# Patient Record
Sex: FEMALE | Race: WHITE | NOT HISPANIC OR LATINO | Employment: UNEMPLOYED | URBAN - METROPOLITAN AREA
[De-identification: names, ages, dates, MRNs, and addresses within clinical notes are randomized per-mention and may not be internally consistent; named-entity substitution may affect disease eponyms.]

---

## 2018-12-13 ENCOUNTER — CLINICAL SUPPORT (OUTPATIENT)
Dept: BARIATRICS | Facility: CLINIC | Age: 53
End: 2018-12-13

## 2018-12-13 VITALS
DIASTOLIC BLOOD PRESSURE: 78 MMHG | HEIGHT: 67 IN | SYSTOLIC BLOOD PRESSURE: 118 MMHG | BODY MASS INDEX: 41.81 KG/M2 | RESPIRATION RATE: 16 BRPM | WEIGHT: 266.4 LBS | TEMPERATURE: 99.6 F | HEART RATE: 78 BPM

## 2018-12-13 DIAGNOSIS — E66.01 MORBID (SEVERE) OBESITY DUE TO EXCESS CALORIES (HCC): Primary | ICD-10-CM

## 2018-12-13 PROCEDURE — RECHECK

## 2018-12-13 RX ORDER — ESCITALOPRAM OXALATE 20 MG/1
10 TABLET ORAL DAILY
COMMUNITY

## 2018-12-13 RX ORDER — OMEPRAZOLE 40 MG/1
40 CAPSULE, DELAYED RELEASE ORAL DAILY
COMMUNITY
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
## 2018-12-14 DIAGNOSIS — E66.01 MORBID OBESITY (HCC): Primary | ICD-10-CM

## 2018-12-14 NOTE — PROGRESS NOTES
Bariatric Nutrition Assessment Note    Type of surgery    Preop  Surgery Date: TBD  Surgeon: Dr Vamsi Cole  48 y o   female     Wt with BMI of 25: 160 5lbs  Pre-Op Excess Wt: 105 5lbs  Blood pressure 118/78, pulse 78, temperature 99 6 °F (37 6 °C), temperature source Tympanic, resp  rate 16, height 5' 7 2" (1 707 m), weight 121 kg (266 lb 6 4 oz)      Weight History   Onset of Obesity: Adult, late 29's once   Family history of obesity: Yes  Wt Loss Attempts: Commercial Programs ([employer]/[place], [employer]eyda [employer], etc )  Exercise  Self Created Diets (Portion Control, Healthy Food Choices, etc )  Maximum Wt Lost: 100lbs with weight watchers, but regained all of that plus more (regained 90lbs in one year)    Review of History and Medications   Past Medical History:   Diagnosis Date    Acid reflux     Anxiety     Asthma     Depression     Frequent headaches     Heartburn     Hiatal hernia     Increased urinary frequency     Night sweats     Osteoarthritis     Sleep apnea     SOB (shortness of breath)     Swelling of multiple joints      Past Surgical History:   Procedure Laterality Date    AUGMENTATION BREAST      FOOT SURGERY Right      Social History     Social History    Marital status: Single     Spouse name: N/A    Number of children: N/A    Years of education: N/A     Social History Main Topics    Smoking status: Former Smoker    Smokeless tobacco: Never Used      Comment: Quit 15 years ago    Alcohol use Yes      Comment: social    Drug use: No    Sexual activity: Not Asked     Other Topics Concern    None     Social History Narrative    None       Current Outpatient Prescriptions:     escitalopram (LEXAPRO) 20 mg tablet, Take 5 mg by mouth daily, Disp: , Rfl:     omeprazole (PriLOSEC) 40 MG capsule, Take 40 mg by mouth daily, Disp: , Rfl:     PROAIR  (90 Base) MCG/ACT inhaler, as needed, Disp: , Rfl: 0    Food Intake and Lifestyle Assessment   Food Intake Assessment completed via food log brought by patient  Breakfast: 1 5cups of cereal with 2% millk (granola type cereal from [employer])  Snack: -   Lunch: Meatloaf and 12oz coke or cheese steak and french fries and a coke or BLT with johansen and a coke  Sometimes will have a Turkish with lunch 1-2 times per week  Snack: -  Dinner: meatloaf and tater tots and beans or coconut flounder with beans and tater tots or mushroom ravioli and asparagus or beer battered fish with creamed spinach or pork chops with mashed potatoes  Snack: admits has been going overboard with the night snack since she got  and moved into her own place about 2 months ago  Admits to eating one whole cake in an evening or 1-2 pints of Haagen [employer]azs ice cream  Beverage intake: water and regular soda and G2  Protein supplement: none at this time  Estimated protein intake per day: >60gm  Estimated fluid intake per day: 64oz (12oz is a regular coke)  Meals eaten away from home: less now that she is   Will occasional stop for chinese or CAPNIA, but that isn't even once a week at this time  Typical meal pattern: 3 meals per day and 1 large snack per day  Eating Behaviors: Consumption of high calorie/ high fat foods, Consumption of high calorie beverages, Large portion sizes, Mindless eating, Emotional eating and Craves sweet foods  Food allergies or intolerances: No Known Allergies  Cultural or Restorationism considerations: none    Physical Assessment  Physical Activity  Types of exercise: None  Current physical limitations: h/o of shatter heel, now with pain when walking too much  Does have a recumbent bike she will start using      Psychosocial Assessment   Support systems: friend(s)  Socioeconomic factors: none    Nutrition Diagnosis  Diagnosis: Overweight / Obesity (NC-3 3)  Related to: Limited adherence to nutrition-related recommendations, Physical inactivity and Excessive energy intake  As Evidenced by: BMI >25 Nutrition Prescription: Recommend the following diet  Regular    Interventions and Teaching   Discussed pre-op and post-op nutrition guidelines  Patient educated and handouts provided  Surgical changes to stomach / GI  Capacity of post-surgery stomach  Diet progression  Adequate hydration  Sugar and fat restriction to decrease "dumping syndrome"  Fat restriction to decrease steatorrhea  Expected weight loss  Weight loss plateaus/ possibility of weight regain  Exercise  Suggestions for pre-op diet  Nutrition considerations after surgery  Protein supplements  Meal planning and preparation  Appropriate carbohydrate, protein, and fat intake, and food/fluid choices to maximize safe weight loss, nutrient intake, and tolerance   Dietary and lifestyle changes  Possible problems with poor eating habits  Intuitive eating  Techniques for self monitoring and keeping daily food journal  Potential for food intolerance after surgery, and ways to deal with them including: lactose intolerance, nausea, reflux, vomiting, diarrhea, food intolerance, appetite changes, gas  Vitamin / Mineral supplementation:  Pt states she is taking a lot of Puritan pride supplement products  Requested she bring in a list at her next appt  Education provided to: patient    Barriers to learning: No barriers identified  Readiness to change: preparation    Prior research on procedure: books, internet, discussed with provider and friends or family    Comprehension: verbalizes understanding     Expected Compliance: good    Recommendations  Pt is an appropriate candidate for surgery  Yes    Pt interested in sleeve gastrectomy in order to prevent further medical issues  She currently has sleep apnea and uses her CPAP  Reviewed her food log and pt appears to eat large portions of high calorie, high fat, high sugar foods    Reviewed the necessary diet changes needed after surgery and came up with some goals to start working on over the next 6 months  Pt is aware her evening snack is excessive and feels she needs to eliminate it completely because just a little bite can trigger her to eat the whole thing  Pt will also start making healthier food choices through the day and keep a food log to be reviewed at her visits  Advised the pt she needs to lose at least 5% of her total body weight by the day of surgery which is 13lbs (BMI [id number])  Evaluation / Monitoring  Dietitian to Monitor:   Eating pattern as discussed   Tolerance of nutrition prescription   Body weight   Physical activity    Goals  Eliminate sugar sweetened beverages (her one can of coke per day)  Food journal (writes it down in a notebook    Requested she bring to all her appts)  Exercise 30 minutes 5 times per week (will start on the stationary bike 10 mins per day and increase as tolerated)  Complete lession plans 1-6  Eat 3 meals per day  Eliminate evening snack of cake and ice cream    Time Spent:   1 Hour

## 2019-01-04 ENCOUNTER — OFFICE VISIT (OUTPATIENT)
Dept: BARIATRICS | Facility: CLINIC | Age: 54
End: 2019-01-04
Payer: COMMERCIAL

## 2019-01-04 VITALS
TEMPERATURE: 98.4 F | RESPIRATION RATE: 14 BRPM | HEIGHT: 67 IN | WEIGHT: 275 LBS | SYSTOLIC BLOOD PRESSURE: 114 MMHG | DIASTOLIC BLOOD PRESSURE: 78 MMHG | HEART RATE: 75 BPM | BODY MASS INDEX: 43.16 KG/M2

## 2019-01-04 DIAGNOSIS — E66.01 MORBID (SEVERE) OBESITY DUE TO EXCESS CALORIES (HCC): Primary | ICD-10-CM

## 2019-01-04 PROCEDURE — 99204 OFFICE O/P NEW MOD 45 MIN: CPT | Performed by: SURGERY

## 2019-01-04 NOTE — PROGRESS NOTES
BARIATRIC INITIAL CONSULT - BARIATRIC SURGERY    Sid Golden 48 y o  female MRN: [id number]  Unit/Bed#:  Encounter: [id number]      HPI:  Sid Golden is a 48 y o  female who presents with a longstanding history of morbid obesity and inability to sustain a meaningful weight loss  Here today to discuss bariatric options  Body mass index is 42 82 kg/m²  ++Suffers from EMORY on CPAP, anxiety, hx Lyme, hx HIATAL HERNIA  S/p breast aug 6-7x, benign rib tumor excision, foot sx    Visit type: initial visit    Symptoms: excess weight, weight increase and inability to loss weight    Associated Symptoms: depressed mood and anxiety    Associated Conditions: sleep apnea and abdominal obesity  Disease Complications: sleep apnea  Weight Loss Interest: high  Previous Diet Trials: low carb    Exercise Frequency:daily  Types of Exercise: walking      Review of Systems   Constitutional: Negative  Eyes: Negative  Respiratory: Negative  Cardiovascular: Negative  Gastrointestinal: Negative  Endocrine: Negative  Genitourinary: Negative  Musculoskeletal: Negative  Skin: Negative  Neurological: Negative  Hematological: Negative  Psychiatric/Behavioral: Negative  All other systems reviewed and are negative        Historical Information   Past Medical History:   Diagnosis Date    Acid reflux     Anxiety     Asthma     Depression     Frequent headaches     Heartburn     Hiatal hernia     Increased urinary frequency     Night sweats     Osteoarthritis     Sleep apnea     SOB (shortness of breath)     Swelling of multiple joints      Past Surgical History:   Procedure Laterality Date    AUGMENTATION BREAST      FOOT SURGERY Right      Social History   History   Alcohol Use    Yes     Comment: social     History   Drug Use No     History   Smoking Status    Former Smoker   Smokeless Tobacco    Never Used     Comment: Quit 15 years ago     Family History:   Family History Problem Relation Age of Onset    Heart attack Mother     Skin cancer Mother     Abnormal EKG Mother    [de-identified] Parkinsonism Father     Skin cancer Sister     Asthma Sister     Allergies Sister        Meds/Allergies   all medications and allergies reviewed  No Known Allergies    Objective       Current Vitals:   /78 (BP Location: Left arm, Patient Position: Sitting, Cuff Size: Large)   Pulse 75   Temp 98 4 °F (36 9 °C) (Tympanic)   Resp 14   Ht 5' 7 2" (1 707 m)   Wt 125 kg (275 lb)   BMI 42 82 kg/m²       Physical Exam   Constitutional: She is oriented to person, place, and time  She appears well-developed  HENT:   Head: Normocephalic  Eyes: Pupils are equal, round, and reactive to light  EOM are normal    Neck: Normal range of motion  Cardiovascular: Normal rate  Pulmonary/Chest: Effort normal    Abdominal: Soft  She exhibits no distension  Musculoskeletal: Normal range of motion  Neurological: She is alert and oriented to person, place, and time  Skin: Skin is warm and dry  Psychiatric: She has a normal mood and affect  Her behavior is normal  Judgment and thought content normal        Lab Results: I have personally reviewed pertinent lab results  Imaging: I have personally reviewed pertinent reports  EKG, Pathology, and Other Studies: I have personally reviewed pertinent reports  Assessment/PLAN:    48 y o  yo female with a long standing h/o of obesity and inability to sustain any meaningful weight loss on her own despite several attempts  She is interested in the Laparoscopic sleeve gastrectomy  ++Suffers from [facility] on CPAP, anxiety, hx Lyme, hx HIATAL HERNIA  S/p breast aug 6-7x, benign rib tumor excision, foot sx    As a part of her pre op evaluation, she will be referred to a cardiologist     She needs an EGD to evaluate the anatomy of her GI tract prior to the operation    I have spent over 45 minutes with her face to face in the office today discussing her options and details of the surgery  We have seen an animation of the surgery on the computer that illustrates how the operation is done and how the anatomy will be altered with the procedure  Over 50% of this was coordinating care  She was given the opportunity to ask questions and I have answered all of them  I have discussed and educated the patient with regards to the components of our multidisciplinary program and the importance of compliance and follow up in the post operative period  The patient was also instructed with regards to the importance of behavior modification, nutritional counseling, support meeting attendance and lifestyle changes that are important to ensure success  Although there is a great statistical chance of improvement or even resolution of most of her associated comorbidities, the results vary from patient to patient and they largely depend on her commitment and compliance  She needs to lose   13 lbs from 266# prior to the operation      LEIA Cordova   1/4/2019  11:43 AM

## 2019-01-11 ENCOUNTER — PREP FOR PROCEDURE (OUTPATIENT)
Dept: BARIATRICS | Facility: CLINIC | Age: 54
End: 2019-01-11

## 2019-01-11 DIAGNOSIS — E66.01 MORBID OBESITY (HCC): Primary | ICD-10-CM

## 2019-02-01 ENCOUNTER — OFFICE VISIT (OUTPATIENT)
Dept: BARIATRICS | Facility: CLINIC | Age: 54
End: 2019-02-01
Payer: COMMERCIAL

## 2019-02-01 VITALS
WEIGHT: 277.13 LBS | SYSTOLIC BLOOD PRESSURE: 112 MMHG | DIASTOLIC BLOOD PRESSURE: 78 MMHG | BODY MASS INDEX: 43.49 KG/M2 | TEMPERATURE: 98.5 F | RESPIRATION RATE: 16 BRPM | HEART RATE: 76 BPM | HEIGHT: 67 IN

## 2019-02-01 DIAGNOSIS — F41.9 ANXIETY: ICD-10-CM

## 2019-02-01 DIAGNOSIS — G47.39 OTHER SLEEP APNEA: ICD-10-CM

## 2019-02-01 DIAGNOSIS — K21.9 GASTROESOPHAGEAL REFLUX DISEASE, ESOPHAGITIS PRESENCE NOT SPECIFIED: ICD-10-CM

## 2019-02-01 DIAGNOSIS — E66.01 MORBID OBESITY (HCC): Primary | ICD-10-CM

## 2019-02-01 PROBLEM — G47.30 SLEEP APNEA: Status: ACTIVE | Noted: 2019-02-01

## 2019-02-01 PROCEDURE — 99214 OFFICE O/P EST MOD 30 MIN: CPT | Performed by: PHYSICIAN ASSISTANT

## 2019-02-01 NOTE — ASSESSMENT & PLAN NOTE
Interested in Vertical Sleeve Gastrectomy with Dr Yaz Dawkins    10% Weight loss-Not applicable   Screening labs-Not Completed  Support Group-Not Completed  Cardiac Risk Assessment-Not Completed  Upper Endoscopy-Not Completed, scheduled; H  Pylori biopsy-not completed  Sleep Medicine Assessment-Has EMORY, uses CPAP  Alcohol and nicotine use is not recommended following bariatric surgery  NSAIDs should be avoided following bariatric surgery  Advised that pregnancy should be avoided following bariatric surgery for 12-18 months and should not solely rely on OCP and consider additional/alternative sources for contraception due to potential absorption issues-completed

## 2019-02-01 NOTE — PROGRESS NOTES
Assessment/Plan:    Sleep apnea  -wears CPAP  -may improve with weight loss    Morbid obesity (HCC)  Interested in Vertical Sleeve Gastrectomy with Dr Dyllan Sousa  10% Weight loss-Not applicable   Screening labs-Not Completed  Support Group-Not Completed  Cardiac Risk Assessment-Not Completed  Upper Endoscopy-Not Completed, scheduled; H  Pylori biopsy-not completed  Sleep Medicine Assessment-Has [facility]ORY, uses CPAP  Alcohol and nicotine use is not recommended following bariatric surgery  NSAIDs should be avoided following bariatric surgery  Advised that pregnancy should be avoided following bariatric surgery for 12-18 months and should not solely rely on OCP and consider additional/alternative sources for contraception due to potential absorption issues-completed    GERD (gastroesophageal reflux disease)  -on omeprazole  -recommend avoid triggers, large meals  -may improve with weight loss    Anxiety  -on Lexapro    Goals:    Food log (ie ) www myfitnesspal com,sparkpeople  com,loseit com,calorieking  com,etc    No sugary beverages  At least 64oz of water daily  Increase physical activity by 10 minutes daily  Gradually increase physical activity to a goal of 5 days per week for 30 minutes of MODERATE intensity PLUS 2 days per week of FULL BODY resistance training  Practice lesson plans 1-6 in bariatric manual   Practice 30/60 rule   2334-2210 calories per day   Start 10 min cardio daily  Please get blood work done before 4th weight check (begininning of April)  Please attend support group    Follow up in approximately 1 month with Non-Surgical Physician/Advanced Practitioner  25 minute visit, >50% face-to-face time spent counseling patient on diet behavior and exercise modification for weight loss       Diagnoses and all orders for this visit:    Morbid obesity ([address] )    Other sleep apnea    Anxiety    Gastroesophageal reflux disease, esophagitis presence not specified          Subjective:   Chief Complaint   Patient presents with    Weight Check     Pt is here for 2/6 weight check visit  Patient ID: Joyce Javier  is a 48 y o  female with excess weight/obesity here to pursue weight managment  Patient is pursuing Vertical Sleeve Gastrectomy  HPI Patient presents for 2/6 weight check    The patient has been:  Following the lessons in the manual- no  Practicing the 30/60 minute rule- no  Food logging- no  Exercise- no    The following portions of the patient's history were reviewed and updated as appropriate: allergies, current medications, past family history, past medical history, past social history, past surgical history and problem list     Review of Systems   Respiratory: Positive for shortness of breath  Negative for cough  Cardiovascular: Negative for chest pain and palpitations  Gastrointestinal: Negative for abdominal pain, nausea and vomiting  Psychiatric/Behavioral: The patient is nervous/anxious  Objective:    /78 (BP Location: Left arm, Patient Position: Sitting, Cuff Size: Large)   Pulse 76   Temp 98 5 °F (36 9 °C) (Tympanic)   Resp 16   Ht 5' 7 2" (1 707 m)   Wt 126 kg (277 lb 2 oz)   BMI 43 15 kg/m²      Physical Exam   Constitutional: She is oriented to person, place, and time  She appears well-developed  HENT:   Head: Normocephalic  Pulmonary/Chest: Effort normal    Musculoskeletal: Normal range of motion  Neurological: She is alert and oriented to person, place, and time  Psychiatric: She has a normal mood and affect  Her behavior is normal    Nursing note and vitals reviewed

## 2019-02-01 NOTE — PATIENT INSTRUCTIONS
Goals: Food log (ie ) www myfitnesspal com,sparkpeople  com,loseit com,calorieking  com,etc    No sugary beverages  At least 64oz of water daily  Increase physical activity by 10 minutes daily   Gradually increase physical activity to a goal of 5 days per week for 30 minutes of MODERATE intensity PLUS 2 days per week of FULL BODY resistance training  Practice lesson plans 1-6 in bariatric manual   Practice 30/60 rule   6678-9894 calories per day   Start 10 min cardio daily  Please get blood work done before 4th weight check (begininning of April)  Please attend support group

## 2019-02-07 LAB
ALBUMIN SERPL-MCNC: 4.3 G/DL (ref 3.5–5.5)
ALBUMIN/GLOB SERPL: 1.5 {RATIO} (ref 1.2–2.2)
ALP SERPL-CCNC: 56 IU/L (ref 39–117)
ALT SERPL-CCNC: 40 IU/L (ref 0–32)
AST SERPL-CCNC: 33 IU/L (ref 0–40)
BILIRUB SERPL-MCNC: 0.4 MG/DL (ref 0–1.2)
BUN SERPL-MCNC: 17 MG/DL (ref 6–24)
BUN/CREAT SERPL: 19 (ref 9–23)
CALCIUM SERPL-MCNC: 9.4 MG/DL (ref 8.7–10.2)
CHLORIDE SERPL-SCNC: 102 MMOL/L (ref 96–106)
CHOLEST SERPL-MCNC: 173 MG/DL (ref 100–199)
CO2 SERPL-SCNC: 24 MMOL/L (ref 20–29)
CREAT SERPL-MCNC: 0.88 MG/DL (ref 0.57–1)
GLOBULIN SER-MCNC: 2.8 G/DL (ref 1.5–4.5)
GLUCOSE SERPL-MCNC: 97 MG/DL (ref 65–99)
HBA1C MFR BLD: 5.7 % (ref 4.8–5.6)
HDLC SERPL-MCNC: 42 MG/DL
LABCORP COMMENT: NORMAL
LDLC SERPL CALC-MCNC: 102 MG/DL (ref 0–99)
POTASSIUM SERPL-SCNC: 4.7 MMOL/L (ref 3.5–5.2)
PROT SERPL-MCNC: 7.1 G/DL (ref 6–8.5)
SL AMB EGFR AFRICAN AMERICAN: 87 ML/MIN/1.73
SL AMB EGFR NON AFRICAN AMERICAN: 75 ML/MIN/1.73
SL AMB VLDL CHOLESTEROL CALC: 29 MG/DL (ref 5–40)
SODIUM SERPL-SCNC: 139 MMOL/L (ref 134–144)
TRIGL SERPL-MCNC: 143 MG/DL (ref 0–149)
TSH SERPL DL<=0.005 MIU/L-ACNC: 2.44 UIU/ML (ref 0.45–4.5)

## 2019-02-22 LAB
BASOPHILS # BLD AUTO: 0 X10E3/UL (ref 0–0.2)
BASOPHILS NFR BLD AUTO: 0 %
EOSINOPHIL # BLD AUTO: 0.2 X10E3/UL (ref 0–0.4)
EOSINOPHIL NFR BLD AUTO: 3 %
ERYTHROCYTE [DISTWIDTH] IN BLOOD BY AUTOMATED COUNT: 13.1 % (ref 12.3–15.4)
HCT VFR BLD AUTO: 40.4 % (ref 34–46.6)
HGB BLD-MCNC: 13.5 G/DL (ref 11.1–15.9)
IMM GRANULOCYTES # BLD: 0 X10E3/UL (ref 0–0.1)
IMM GRANULOCYTES NFR BLD: 0 %
LYMPHOCYTES # BLD AUTO: 1.8 X10E3/UL (ref 0.7–3.1)
LYMPHOCYTES NFR BLD AUTO: 29 %
MCH RBC QN AUTO: 31.5 PG (ref 26.6–33)
MCHC RBC AUTO-ENTMCNC: 33.4 G/DL (ref 31.5–35.7)
MCV RBC AUTO: 94 FL (ref 79–97)
MONOCYTES # BLD AUTO: 0.4 X10E3/UL (ref 0.1–0.9)
MONOCYTES NFR BLD AUTO: 7 %
NEUTROPHILS # BLD AUTO: 3.6 X10E3/UL (ref 1.4–7)
NEUTROPHILS NFR BLD AUTO: 61 %
PLATELET # BLD AUTO: 266 X10E3/UL (ref 150–379)
RBC # BLD AUTO: 4.28 X10E6/UL (ref 3.77–5.28)
WBC # BLD AUTO: 6 X10E3/UL (ref 3.4–10.8)

## 2019-03-01 ENCOUNTER — OFFICE VISIT (OUTPATIENT)
Dept: BARIATRICS | Facility: CLINIC | Age: 54
End: 2019-03-01
Payer: COMMERCIAL

## 2019-03-01 VITALS
BODY MASS INDEX: 42.85 KG/M2 | HEART RATE: 87 BPM | HEIGHT: 67 IN | SYSTOLIC BLOOD PRESSURE: 102 MMHG | RESPIRATION RATE: 16 BRPM | TEMPERATURE: 98.2 F | DIASTOLIC BLOOD PRESSURE: 68 MMHG | WEIGHT: 273 LBS

## 2019-03-01 DIAGNOSIS — G47.39 OTHER SLEEP APNEA: ICD-10-CM

## 2019-03-01 DIAGNOSIS — E66.01 MORBID OBESITY (HCC): Primary | ICD-10-CM

## 2019-03-01 DIAGNOSIS — K21.9 GASTROESOPHAGEAL REFLUX DISEASE, ESOPHAGITIS PRESENCE NOT SPECIFIED: ICD-10-CM

## 2019-03-01 PROCEDURE — 99214 OFFICE O/P EST MOD 30 MIN: CPT | Performed by: PHYSICIAN ASSISTANT

## 2019-03-01 RX ORDER — CALCIUM GLUCONATE 45(500) MG
TABLET ORAL DAILY
COMMUNITY

## 2019-03-01 RX ORDER — MULTIVIT WITH MINERALS/LUTEIN
1000 TABLET ORAL DAILY
COMMUNITY
End: 2019-08-20 | Stop reason: HOSPADM

## 2019-03-01 RX ORDER — MULTIVIT WITH MINERALS/LUTEIN
1000 TABLET ORAL DAILY
COMMUNITY

## 2019-03-01 NOTE — PROGRESS NOTES
Assessment/Plan: Morbid obesity ([facility] 75 )  Interested in Vertical Sleeve Gastrectomy with Dr Walker Lose  10% Weight loss-Not applicable   Screening labs-Completed  Support Group-not completed  Cardiac Risk Assessment-Not Completed  Upper Endoscopy-Not Completed, scheduled; H  Pylori biopsy-not completed  Sleep Medicine Assessment-Has [facility], uses CPAP  Alcohol and nicotine use is not recommended following bariatric surgery  NSAIDs should be avoided following bariatric surgery  Advised that pregnancy should be avoided following bariatric surgery for 12-18 months and should not solely rely on OCP and consider additional/alternative sources for contraception due to potential absorption issues-completed    Sleep apnea  -wears CPAP  -may improve with weight loss    GERD (gastroesophageal reflux disease)  -on omeprazole  -recommend avoid triggers, large meals  -may improve with weight loss    Goals:    Food log (ie ) www myfitnesspal com,sparkpeople  com,loseit com,calorieking  com,etc    No sugary beverages  At least 64oz of water daily  Increase physical activity by 10 minutes daily  Gradually increase physical activity to a goal of 5 days per week for 30 minutes of MODERATE intensity PLUS 2 days per week of FULL BODY resistance training  Practice lesson plans 1-6 in bariatric manual   Practice 30/60 rule  Please schedule cardiology preoperative clearance - [phone]  Please attend support group    Follow up in approximately 1 month with Surgical Dietician  25 minute visit, >50% face-to-face time spent counseling patient on diet behavior and exercise modification for weight loss  Diagnoses and all orders for this visit:    Morbid obesity ([facility] 75 )    Other sleep apnea    Gastroesophageal reflux disease, esophagitis presence not specified    Other orders  -     Cholecalciferol (VITAMIN D3) 5000 units TABS; Take 5,000 Units by mouth daily  -     calcium gluconate 500 mg tablet;  Take 2400 mg daily  -     FOLIC ACID PO; Take 266 mg daily  -     Ascorbic Acid (VITAMIN C) 1000 MG tablet; Take 1,000 mg by mouth daily  -     vitamin E, tocopherol, 1,000 units capsule; Take 1,000 Units by mouth daily  -     co-enzyme Q-10 30 MG capsule; Take 30 mg by mouth daily  -     Misc Natural Products (TUMERSAID PO); Take 800 mg daily  -     Green Tea, Camillia sinensis, (GREEN TEA EXTRACT PO); Take 1 tablet by mouth daily  -     NON FORMULARY; Take 1 tab daily [facility])          Subjective:   Chief Complaint   Patient presents with    Weight Check     pt is here for 3/6 weight check  Patient ID: Grace Miller  is a 48 y o  female with excess weight/obesity here to pursue weight managment  Patient is pursuing Vertical Sleeve Gastrectomy  HPI Patient presents for 3/6 weight check    The patient has been:  Following the lessons in the [facility]  Practicing the 30/60 minute rule- yes  Food logging- reports food journaling with pen and paper 2500  Exercise- 10 min bike riding daily  And 30 min walk 2-3x per week    The following portions of the patient's history were reviewed and updated as appropriate: allergies, current medications, past family history, past medical history, past social history, past surgical history and problem list     Review of Systems   Respiratory: Negative for cough and shortness of breath  Cardiovascular: Negative for chest pain and palpitations  Gastrointestinal: Negative for abdominal pain, nausea and vomiting  Objective:    /68 (BP Location: Left arm, Patient Position: Sitting, Cuff Size: Large)   Pulse 87   Temp 98 2 °F (36 8 °C) (Tympanic)   Resp 16   Ht 5' 7 2" (1 707 m)   Wt 124 kg (273 lb)   BMI 42 50 kg/m²      Physical Exam   Constitutional: She is oriented to person, place, and time  She appears well-developed and well-nourished  HENT:   Head: Normocephalic  Pulmonary/Chest: Effort normal    Neurological: She is alert and oriented to person, place, and time  Skin: Skin is warm  Psychiatric: She has a normal mood and affect  Her behavior is normal  Thought content normal    Nursing note and vitals reviewed

## 2019-03-01 NOTE — ASSESSMENT & PLAN NOTE
Interested in Vertical Sleeve Gastrectomy with Dr Vahid Sepulveda    10% Weight loss-Not applicable   Screening labs-Completed  Support Group-not completed  Cardiac Risk Assessment-Not Completed  Upper Endoscopy-Not Completed, scheduled; H  Pylori biopsy-not completed  Sleep Medicine Assessment-Has [facility], uses CPAP  Alcohol and nicotine use is not recommended following bariatric surgery  NSAIDs should be avoided following bariatric surgery  Advised that pregnancy should be avoided following bariatric surgery for 12-18 months and should not solely rely on OCP and consider additional/alternative sources for contraception due to potential absorption issues-completed

## 2019-03-01 NOTE — PATIENT INSTRUCTIONS
Goals: Food log (ie ) www myfitnesspal com,sparkpeople  com,loseit com,calorieking  com,etc    No sugary beverages  At least 64oz of water daily  Increase physical activity by 10 minutes daily   Gradually increase physical activity to a goal of 5 days per week for 30 minutes of MODERATE intensity PLUS 2 days per week of FULL BODY resistance training  Practice lesson plans 1-6 in bariatric manual   Practice 30/60 rule  Please schedule cardiology preoperative clearance - [phone]  Please attend support group

## 2019-04-05 ENCOUNTER — OFFICE VISIT (OUTPATIENT)
Dept: BARIATRICS | Facility: CLINIC | Age: 54
End: 2019-04-05

## 2019-04-05 VITALS — HEIGHT: 67 IN | BODY MASS INDEX: 41.47 KG/M2 | WEIGHT: 264.2 LBS

## 2019-04-05 DIAGNOSIS — Z98.84 BARIATRIC SURGERY STATUS: ICD-10-CM

## 2019-04-05 DIAGNOSIS — E66.01 MORBID (SEVERE) OBESITY DUE TO EXCESS CALORIES (HCC): Primary | ICD-10-CM

## 2019-04-05 PROCEDURE — RECHECK

## 2019-04-22 ENCOUNTER — TELEPHONE (OUTPATIENT)
Dept: BARIATRICS | Facility: CLINIC | Age: 54
End: 2019-04-22

## 2019-04-24 ENCOUNTER — ANESTHESIA EVENT (OUTPATIENT)
Dept: PERIOP | Facility: HOSPITAL | Age: 54
End: 2019-04-24
Payer: COMMERCIAL

## 2019-04-26 ENCOUNTER — ANESTHESIA (OUTPATIENT)
Dept: PERIOP | Facility: HOSPITAL | Age: 54
End: 2019-04-26
Payer: COMMERCIAL

## 2019-04-26 ENCOUNTER — HOSPITAL ENCOUNTER (OUTPATIENT)
Facility: HOSPITAL | Age: 54
Setting detail: OUTPATIENT SURGERY
Discharge: HOME | End: 2019-04-26
Attending: SURGERY | Admitting: SURGERY
Payer: COMMERCIAL

## 2019-04-26 VITALS
DIASTOLIC BLOOD PRESSURE: 57 MMHG | WEIGHT: 266 LBS | BODY MASS INDEX: 41.75 KG/M2 | SYSTOLIC BLOOD PRESSURE: 114 MMHG | OXYGEN SATURATION: 92 % | TEMPERATURE: 98.7 F | HEIGHT: 67 IN | HEART RATE: 93 BPM | RESPIRATION RATE: 20 BRPM

## 2019-04-26 DIAGNOSIS — E66.01 MORBID OBESITY (HCC): ICD-10-CM

## 2019-04-26 LAB — EXT PREGNANCY TEST URINE: NEGATIVE

## 2019-04-26 PROCEDURE — 88342 IMHCHEM/IMCYTCHM 1ST ANTB: CPT | Performed by: PATHOLOGY

## 2019-04-26 PROCEDURE — 88305 TISSUE EXAM BY PATHOLOGIST: CPT | Performed by: PATHOLOGY

## 2019-04-26 PROCEDURE — 81025 URINE PREGNANCY TEST: CPT | Performed by: ANESTHESIOLOGY

## 2019-04-26 PROCEDURE — NC001 PR NO CHARGE: Performed by: SURGERY

## 2019-04-26 PROCEDURE — 43239 EGD BIOPSY SINGLE/MULTIPLE: CPT | Performed by: SURGERY

## 2019-04-26 RX ORDER — IPRATROPIUM BROMIDE AND ALBUTEROL SULFATE 2.5; .5 MG/3ML; MG/3ML
3 SOLUTION RESPIRATORY (INHALATION) ONCE
Status: COMPLETED | OUTPATIENT
Start: 2019-04-26 | End: 2019-04-26

## 2019-04-26 RX ORDER — ONDANSETRON 2 MG/ML
4 INJECTION INTRAMUSCULAR; INTRAVENOUS ONCE AS NEEDED
Status: DISCONTINUED | OUTPATIENT
Start: 2019-04-26 | End: 2019-04-26 | Stop reason: HOSPADM

## 2019-04-26 RX ORDER — LIDOCAINE HYDROCHLORIDE 10 MG/ML
INJECTION, SOLUTION INFILTRATION; PERINEURAL AS NEEDED
Status: DISCONTINUED | OUTPATIENT
Start: 2019-04-26 | End: 2019-04-26 | Stop reason: SURG

## 2019-04-26 RX ORDER — PROPOFOL 10 MG/ML
INJECTION, EMULSION INTRAVENOUS AS NEEDED
Status: DISCONTINUED | OUTPATIENT
Start: 2019-04-26 | End: 2019-04-26 | Stop reason: SURG

## 2019-04-26 RX ORDER — SODIUM CHLORIDE, SODIUM LACTATE, POTASSIUM CHLORIDE, CALCIUM CHLORIDE 600; 310; 30; 20 MG/100ML; MG/100ML; MG/100ML; MG/100ML
100 INJECTION, SOLUTION INTRAVENOUS CONTINUOUS
Status: DISCONTINUED | OUTPATIENT
Start: 2019-04-26 | End: 2019-04-26 | Stop reason: HOSPADM

## 2019-04-26 RX ORDER — DIPHENHYDRAMINE HYDROCHLORIDE 50 MG/ML
12.5 INJECTION INTRAMUSCULAR; INTRAVENOUS ONCE AS NEEDED
Status: DISCONTINUED | OUTPATIENT
Start: 2019-04-26 | End: 2019-04-26 | Stop reason: HOSPADM

## 2019-04-26 RX ADMIN — SODIUM CHLORIDE, SODIUM LACTATE, POTASSIUM CHLORIDE, AND CALCIUM CHLORIDE 100 ML/HR: .6; .31; .03; .02 INJECTION, SOLUTION INTRAVENOUS at 07:51

## 2019-04-26 RX ADMIN — LIDOCAINE HYDROCHLORIDE 100 MG: 10 INJECTION, SOLUTION INFILTRATION; PERINEURAL at 08:30

## 2019-04-26 RX ADMIN — IPRATROPIUM BROMIDE AND ALBUTEROL SULFATE 3 ML: 2.5; .5 SOLUTION RESPIRATORY (INHALATION) at 08:14

## 2019-04-26 RX ADMIN — PROPOFOL 50 MG: 10 INJECTION, EMULSION INTRAVENOUS at 08:33

## 2019-04-26 RX ADMIN — TOPICAL ANESTHETIC 2 SPRAY: 200 SPRAY DENTAL; PERIODONTAL at 08:28

## 2019-04-26 RX ADMIN — SODIUM CHLORIDE, SODIUM LACTATE, POTASSIUM CHLORIDE, AND CALCIUM CHLORIDE 100 ML/HR: .6; .31; .03; .02 INJECTION, SOLUTION INTRAVENOUS at 09:15

## 2019-04-26 RX ADMIN — PROPOFOL 50 MG: 10 INJECTION, EMULSION INTRAVENOUS at 08:35

## 2019-04-26 RX ADMIN — PROPOFOL 100 MG: 10 INJECTION, EMULSION INTRAVENOUS at 08:30

## 2019-04-30 ENCOUNTER — OFFICE VISIT (OUTPATIENT)
Dept: CARDIOLOGY CLINIC | Facility: CLINIC | Age: 54
End: 2019-04-30
Payer: COMMERCIAL

## 2019-04-30 VITALS
HEART RATE: 68 BPM | HEIGHT: 67 IN | SYSTOLIC BLOOD PRESSURE: 110 MMHG | OXYGEN SATURATION: 97 % | BODY MASS INDEX: 41.44 KG/M2 | DIASTOLIC BLOOD PRESSURE: 68 MMHG | WEIGHT: 264 LBS

## 2019-04-30 DIAGNOSIS — Z01.810 PREOPERATIVE CARDIOVASCULAR EXAMINATION: Primary | ICD-10-CM

## 2019-04-30 DIAGNOSIS — R06.02 SHORTNESS OF BREATH: ICD-10-CM

## 2019-04-30 DIAGNOSIS — E66.01 MORBID OBESITY (HCC): ICD-10-CM

## 2019-04-30 PROCEDURE — 93000 ELECTROCARDIOGRAM COMPLETE: CPT | Performed by: INTERNAL MEDICINE

## 2019-04-30 PROCEDURE — 99243 OFF/OP CNSLTJ NEW/EST LOW 30: CPT | Performed by: INTERNAL MEDICINE

## 2019-05-03 ENCOUNTER — OFFICE VISIT (OUTPATIENT)
Dept: BARIATRICS | Facility: CLINIC | Age: 54
End: 2019-05-03

## 2019-05-03 VITALS — WEIGHT: 260.4 LBS | HEIGHT: 67 IN | BODY MASS INDEX: 40.87 KG/M2

## 2019-05-03 DIAGNOSIS — E66.01 MORBID (SEVERE) OBESITY DUE TO EXCESS CALORIES (HCC): Primary | ICD-10-CM

## 2019-05-03 DIAGNOSIS — Z98.84 BARIATRIC SURGERY STATUS: ICD-10-CM

## 2019-05-03 PROCEDURE — RECHECK

## 2019-05-16 ENCOUNTER — HOSPITAL ENCOUNTER (OUTPATIENT)
Dept: PULMONOLOGY | Facility: HOSPITAL | Age: 54
Discharge: HOME | End: 2019-05-16
Attending: INTERNAL MEDICINE
Payer: COMMERCIAL

## 2019-05-16 DIAGNOSIS — R06.02 SHORTNESS OF BREATH: ICD-10-CM

## 2019-05-16 PROCEDURE — 94726 PLETHYSMOGRAPHY LUNG VOLUMES: CPT | Performed by: INTERNAL MEDICINE

## 2019-05-16 PROCEDURE — 94726 PLETHYSMOGRAPHY LUNG VOLUMES: CPT

## 2019-05-16 PROCEDURE — 94010 BREATHING CAPACITY TEST: CPT | Performed by: INTERNAL MEDICINE

## 2019-05-16 PROCEDURE — 94729 DIFFUSING CAPACITY: CPT | Performed by: INTERNAL MEDICINE

## 2019-05-16 RX ORDER — ALBUTEROL SULFATE 2.5 MG/3ML
2.5 SOLUTION RESPIRATORY (INHALATION) ONCE
Status: DISCONTINUED | OUTPATIENT
Start: 2019-05-16 | End: 2019-05-20 | Stop reason: HOSPADM

## 2019-05-16 RX ADMIN — METHACHOLINE CHLORIDE 5 BREATH: 100 POWDER, FOR SOLUTION RESPIRATORY (INHALATION) at 14:42

## 2019-06-03 ENCOUNTER — OFFICE VISIT (OUTPATIENT)
Dept: BARIATRICS | Facility: CLINIC | Age: 54
End: 2019-06-03

## 2019-06-03 VITALS — HEIGHT: 67 IN | BODY MASS INDEX: 40.65 KG/M2 | WEIGHT: 259 LBS

## 2019-06-03 DIAGNOSIS — E66.01 MORBID (SEVERE) OBESITY DUE TO EXCESS CALORIES (HCC): Primary | ICD-10-CM

## 2019-06-03 DIAGNOSIS — Z98.84 BARIATRIC SURGERY STATUS: ICD-10-CM

## 2019-06-03 PROCEDURE — RECHECK

## 2019-08-01 ENCOUNTER — PREP FOR PROCEDURE (OUTPATIENT)
Dept: BARIATRICS | Facility: CLINIC | Age: 54
End: 2019-08-01

## 2019-08-01 DIAGNOSIS — E66.01 MORBID OBESITY (HCC): Primary | ICD-10-CM

## 2019-08-01 DIAGNOSIS — K21.9 GASTROESOPHAGEAL REFLUX DISEASE: ICD-10-CM

## 2019-08-01 DIAGNOSIS — G47.30 SLEEP APNEA: ICD-10-CM

## 2019-08-09 ENCOUNTER — OFFICE VISIT (OUTPATIENT)
Dept: BARIATRICS | Facility: CLINIC | Age: 54
End: 2019-08-09
Payer: COMMERCIAL

## 2019-08-09 VITALS
WEIGHT: 254 LBS | DIASTOLIC BLOOD PRESSURE: 70 MMHG | TEMPERATURE: 97.9 F | SYSTOLIC BLOOD PRESSURE: 110 MMHG | HEIGHT: 67 IN | BODY MASS INDEX: 39.87 KG/M2 | HEART RATE: 78 BPM

## 2019-08-09 DIAGNOSIS — E66.01 MORBID (SEVERE) OBESITY DUE TO EXCESS CALORIES (HCC): Primary | ICD-10-CM

## 2019-08-09 DIAGNOSIS — E66.01 MORBID OBESITY (HCC): Primary | ICD-10-CM

## 2019-08-09 PROCEDURE — 99214 OFFICE O/P EST MOD 30 MIN: CPT | Performed by: SURGERY

## 2019-08-09 RX ORDER — MELOXICAM 7.5 MG/1
7.5 TABLET ORAL DAILY
Refills: 2 | COMMUNITY
Start: 2019-05-29 | End: 2019-08-13

## 2019-08-09 RX ORDER — ENOXAPARIN SODIUM 300 MG/3ML
40 INJECTION INTRAVENOUS; SUBCUTANEOUS
Status: CANCELLED | OUTPATIENT
Start: 2019-08-09 | End: 2019-08-10

## 2019-08-09 RX ORDER — OXYCODONE HYDROCHLORIDE 5 MG/1
5 TABLET ORAL EVERY 4 HOURS PRN
Qty: 15 TABLET | Refills: 0 | Status: SHIPPED | OUTPATIENT
Start: 2019-08-09 | End: 2019-08-20 | Stop reason: HOSPADM

## 2019-08-09 RX ORDER — GABAPENTIN 100 MG/1
600 CAPSULE ORAL ONCE
Status: CANCELLED | OUTPATIENT
Start: 2019-08-09 | End: 2019-08-09

## 2019-08-09 RX ORDER — SCOLOPAMINE TRANSDERMAL SYSTEM 1 MG/1
1 PATCH, EXTENDED RELEASE TRANSDERMAL ONCE
Status: CANCELLED | OUTPATIENT
Start: 2019-08-09 | End: 2019-08-09

## 2019-08-09 RX ORDER — LEVONORGESTREL AND ETHINYL ESTRADIOL 0.1-0.02MG
KIT ORAL
COMMUNITY
End: 2019-08-13

## 2019-08-09 RX ORDER — ZINC GLUCONATE 50 MG
50 TABLET ORAL DAILY
COMMUNITY

## 2019-08-09 RX ORDER — ACETAMINOPHEN 325 MG/1
975 TABLET ORAL ONCE
Status: CANCELLED | OUTPATIENT
Start: 2019-08-09 | End: 2019-08-09

## 2019-08-09 RX ORDER — CELECOXIB 200 MG/1
200 CAPSULE ORAL ONCE
Status: CANCELLED | OUTPATIENT
Start: 2019-08-09 | End: 2019-08-09

## 2019-08-09 NOTE — LETTER
August 9, 2019     Maria Scherer DO  [address] [place] [address]    Patient: Gabe    YOB: 1965   Date of Visit: 8/9/2019       Dear Dr Jacqui Jack: Thank you for referring Kesha Quinn to me for evaluation for bariatric surgery  Below are my notes for this preop visit  She will be undergoing a laparoscopic sleeve gastrectomy with possible hiatal hernia repair  If you have questions, please do not hesitate to call me  I look forward to following your patient along with you  Sincerely,      LEIA Brito   8/9/2019  9:21 AM          CC: No Recipients  Crystal Garcia MD  8/9/2019  9:19 AM  Sign at close encounter  H&P Exam - Bariatric Surgery   Gabe  48 y o  female MRN: [id number]   Encounter: [id number]      HPI:    48 y o  yo morbidly obese female found to be a good candidate to undergo a weight loss operation upon being enrolled here at the [facility]   She has been pre certified to undergo a Laparoscopic sleeve gastrectomy with hiatal hernia repair  I have discussed with the patient that 20-30% of patient's may experience worsened GERD and 18% risk of Palmer's esophagitis requiring surveillance EGDs after a sleeve gastrectomy  She understands this fully and still requests to undergo a sleeve gastrectomy  ++Suffers from [facility] on CPAP, anxiety, hx Lyme, hx HIATAL HERNIA  S/p breast aug 6-7x, benign rib tumor excision, foot sx    Here today to review her pre op test results  Has been medically cleared for the procedure  I have explained our Enhanced Recovery After Bariatric Surgery (ERABS) protocol and benefits including preoperative, intraoperative and postoperative elements  I have discussed with her at length the risks and benefits of the operation and reiterated the components of our multidisciplinary program and the importance of compliance and follow up in the post operative period   Although there is a great statistical chance of improvement or even resolution of most of her associated comorbidities, the results vary from patient to patient and they largely depend on his commitment  The patient was also instructed with regards to the importance of behavior modification, nutritional counseling, support meeting attendance and lifestyle changes that are important to ensure success  She was given the opportunity to ask questions and I have answered all of them  I have addressed with the patient the level of CODE STATUS for this hospital stay and after explaining the different options currently she wishes to be a Level I  She understands and wishes to proceed  She has lost all the weight required prior to surgery  Review of Systems   Constitutional: Negative  Respiratory: Negative  Cardiovascular: Negative  Gastrointestinal: Negative  Musculoskeletal: Negative  Neurological: Negative  All other systems reviewed and are negative  Historical Information   Past Medical History:   Diagnosis Date    Acid reflux     Anxiety     Asthma     CPAP (continuous positive airway pressure) dependence     Depression     Frequent headaches     Heartburn     Hiatal hernia     Increased urinary frequency     Lyme disease     Night sweats     Obesity     Osteoarthritis     Sleep apnea     SOB (shortness of breath)     Swelling of multiple joints      Past Surgical History:   Procedure Laterality Date    AUGMENTATION BREAST      BREAST SURGERY      implants(silicon) repaired x5 last [id number]    COCCYGECTOMY  03/25/2019    COLONOSCOPY      FOOT SURGERY Right     x4    [place] EGD TRANSORAL BIOPSY SINGLE/MULTIPLE N/A 4/26/2019    Procedure: ESOPHAGOGASTRODUODENOSCOPY (EGD);   Surgeon: Wilber Myers MD;  Location: [facility];  Service: General     Social History   Social History     Substance and Sexual Activity   Alcohol Use Yes    Comment: social     Social History     Substance and Sexual Activity   Drug Use No     Social History     Tobacco Use   Smoking Status Former Smoker    Last attempt to quit: 1995    Years since quittin 3   Smokeless Tobacco Never Used   Tobacco Comment    Quit 15 years ago     Family History: non-contributory    Meds/Allergies   all medications and allergies reviewed  No Known Allergies    Objective     Current Vitals:   Blood Pressure: 110/70 (19)  Pulse: 78 (19)  Temperature: 97 9 °F (36 6 °C) (19)  Temp Source: Tympanic (19)  Height: 5' 7 2" (170 7 cm) (19)  Weight - Scale: 115 kg (254 lb) (19)        Physical Exam   Constitutional: She is oriented to person, place, and time  She appears well-developed  HENT:   Head: Normocephalic  Eyes: EOM are normal    Neck: Normal range of motion  Cardiovascular: Normal rate and normal heart sounds  Pulmonary/Chest: Effort normal and breath sounds normal    Abdominal: Soft  She exhibits no distension  There is no tenderness  There is no rebound  Musculoskeletal: Normal range of motion  Neurological: She is alert and oriented to person, place, and time  Skin: Skin is warm and dry  Psychiatric: She has a normal mood and affect  Her behavior is normal  Judgment and thought content normal        Lab Results: I have personally reviewed pertinent lab results  Imaging: I have personally reviewed pertinent reports  EKG, Pathology, and Other Studies: I have personally reviewed pertinent reports  Code Status: Level 1    Assessment:  48 y o  yo morbidly obese female found to be a good candidate to undergo a weight loss operation upon being enrolled here at the [address] [place] [address]  She has completed all of the preoperative process for bariatric surgery  Plan:  - Plan for laparoscopic possible open SG with HHR with intraoperative EGD  - I discussed the risk of rescheduling/canceling the case if goal weight not met    - She must be at 253# day of surgery  - consent signed  - preop orders placed

## 2019-08-09 NOTE — H&P (VIEW-ONLY)
H&P Exam - Bariatric Surgery   Albertus Pouch 48 y o  female MRN: [id number]   Encounter: [id number]      HPI:    48 y o  yo morbidly obese female found to be a good candidate to undergo a weight loss operation upon being enrolled here at the [facility]   She has been pre certified to undergo a Laparoscopic sleeve gastrectomy with hiatal hernia repair  I have discussed with the patient that 20-30% of patient's may experience worsened GERD and 18% risk of Palmer's esophagitis requiring surveillance EGDs after a sleeve gastrectomy  She understands this fully and still requests to undergo a sleeve gastrectomy  ++Suffers from [facility] on CPAP, anxiety, hx Lyme, hx HIATAL HERNIA  S/p breast aug 6-7x, benign rib tumor excision, foot sx    Here today to review her pre op test results  Has been medically cleared for the procedure  I have explained our Enhanced Recovery After Bariatric Surgery (ERABS) protocol and benefits including preoperative, intraoperative and postoperative elements  I have discussed with her at length the risks and benefits of the operation and reiterated the components of our multidisciplinary program and the importance of compliance and follow up in the post operative period  Although there is a great statistical chance of improvement or even resolution of most of her associated comorbidities, the results vary from patient to patient and they largely depend on his commitment  The patient was also instructed with regards to the importance of behavior modification, nutritional counseling, support meeting attendance and lifestyle changes that are important to ensure success  She was given the opportunity to ask questions and I have answered all of them  I have addressed with the patient the level of CODE STATUS for this hospital stay and after explaining the different options currently she wishes to be a Level I  She understands and wishes to proceed      She has lost all the weight required prior to surgery  Review of Systems   Constitutional: Negative  Respiratory: Negative  Cardiovascular: Negative  Gastrointestinal: Negative  Musculoskeletal: Negative  Neurological: Negative  All other systems reviewed and are negative  Historical Information   Past Medical History:   Diagnosis Date    Acid reflux     Anxiety     Asthma     CPAP (continuous positive airway pressure) dependence     Depression     Frequent headaches     Heartburn     Hiatal hernia     Increased urinary frequency     Lyme disease     Night sweats     Obesity     Osteoarthritis     Sleep apnea     SOB (shortness of breath)     Swelling of multiple joints      Past Surgical History:   Procedure Laterality Date    AUGMENTATION BREAST      BREAST SURGERY      implants(silicon) repaired x5 last [id number]    COCCYGECTOMY  2019    COLONOSCOPY      FOOT SURGERY Right     x4    ID EGD TRANSORAL BIOPSY SINGLE/MULTIPLE N/A 2019    Procedure: ESOPHAGOGASTRODUODENOSCOPY (EGD);   Surgeon: Joseph Mendoza MD;  Location: [address] [place] [address];  Service: General     Social History   Social History     Substance and Sexual Activity   Alcohol Use Yes    Comment: social     Social History     Substance and Sexual Activity   Drug Use No     Social History     Tobacco Use   Smoking Status Former Smoker    Last attempt to quit: 1995    Years since quittin 3   Smokeless Tobacco Never Used   Tobacco Comment    Quit 15 years ago     Family History: non-contributory    Meds/Allergies   all medications and allergies reviewed  No Known Allergies    Objective     Current Vitals:   Blood Pressure: 110/70 (19)  Pulse: 78 (19)  Temperature: 97 9 °F (36 6 °C) (19)  Temp Source: Tympanic (19)  Height: 5' 7 2" (170 7 cm) (19)  Weight - Scale: 115 kg (254 lb) (19)        Physical Exam   Constitutional: She is oriented to person, place, and time  She appears well-developed  HENT:   Head: Normocephalic  Eyes: EOM are normal    Neck: Normal range of motion  Cardiovascular: Normal rate and normal heart sounds  Pulmonary/Chest: Effort normal and breath sounds normal    Abdominal: Soft  She exhibits no distension  There is no tenderness  There is no rebound  Musculoskeletal: Normal range of motion  Neurological: She is alert and oriented to person, place, and time  Skin: Skin is warm and dry  Psychiatric: She has a normal mood and affect  Her behavior is normal  Judgment and thought content normal        Lab Results: I have personally reviewed pertinent lab results  Imaging: I have personally reviewed pertinent reports  EKG, Pathology, and Other Studies: I have personally reviewed pertinent reports  Code Status: Level 1    Assessment:  48 y o  yo morbidly obese female found to be a good candidate to undergo a weight loss operation upon being enrolled here at the [address] [place] [address]  She has completed all of the preoperative process for bariatric surgery  Plan:  - Plan for laparoscopic possible open SG with HHR with intraoperative EGD  - I discussed the risk of rescheduling/canceling the case if goal weight not met    - She must be at 253# day of surgery  - consent signed  - preop orders placed

## 2019-08-09 NOTE — PROGRESS NOTES
H&P Exam - Bariatric Surgery   Tad Elizondo 48 y o  female MRN: [id number]   Encounter: [id number]      HPI:    48 y o  yo morbidly obese female found to be a good candidate to undergo a weight loss operation upon being enrolled here at the [facility]   She has been pre certified to undergo a Laparoscopic sleeve gastrectomy with hiatal hernia repair  I have discussed with the patient that 20-30% of patient's may experience worsened GERD and 18% risk of Palmer's esophagitis requiring surveillance EGDs after a sleeve gastrectomy  She understands this fully and still requests to undergo a sleeve gastrectomy  ++Suffers from [facility] on CPAP, anxiety, hx Lyme, hx HIATAL HERNIA  S/p breast aug 6-7x, benign rib tumor excision, foot sx    Here today to review her pre op test results  Has been medically cleared for the procedure  I have explained our Enhanced Recovery After Bariatric Surgery (ERABS) protocol and benefits including preoperative, intraoperative and postoperative elements  I have discussed with her at length the risks and benefits of the operation and reiterated the components of our multidisciplinary program and the importance of compliance and follow up in the post operative period  Although there is a great statistical chance of improvement or even resolution of most of her associated comorbidities, the results vary from patient to patient and they largely depend on his commitment  The patient was also instructed with regards to the importance of behavior modification, nutritional counseling, support meeting attendance and lifestyle changes that are important to ensure success  She was given the opportunity to ask questions and I have answered all of them  I have addressed with the patient the level of CODE STATUS for this hospital stay and after explaining the different options currently she wishes to be a Level I  She understands and wishes to proceed      She has lost all the weight required prior to surgery  Review of Systems   Constitutional: Negative  Respiratory: Negative  Cardiovascular: Negative  Gastrointestinal: Negative  Musculoskeletal: Negative  Neurological: Negative  All other systems reviewed and are negative  Historical Information   Past Medical History:   Diagnosis Date    Acid reflux     Anxiety     Asthma     CPAP (continuous positive airway pressure) dependence     Depression     Frequent headaches     Heartburn     Hiatal hernia     Increased urinary frequency     Lyme disease     Night sweats     Obesity     Osteoarthritis     Sleep apnea     SOB (shortness of breath)     Swelling of multiple joints      Past Surgical History:   Procedure Laterality Date    AUGMENTATION BREAST      BREAST SURGERY      implants(silicon) repaired x5 last [id number]    COCCYGECTOMY  2019    COLONOSCOPY      FOOT SURGERY Right     x4    [place] EGD TRANSORAL BIOPSY SINGLE/MULTIPLE N/A 2019    Procedure: ESOPHAGOGASTRODUODENOSCOPY (EGD);   Surgeon: Dennis Park MD;  Location: [address] [place] [address];  Service: General     Social History   Social History     Substance and Sexual Activity   Alcohol Use Yes    Comment: social     Social History     Substance and Sexual Activity   Drug Use No     Social History     Tobacco Use   Smoking Status Former Smoker    Last attempt to quit: 1995    Years since quittin 3   Smokeless Tobacco Never Used   Tobacco Comment    Quit 15 years ago     Family History: non-contributory    Meds/Allergies   all medications and allergies reviewed  No Known Allergies    Objective     Current Vitals:   Blood Pressure: 110/70 (19)  Pulse: 78 (19)  Temperature: 97 9 °F (36 6 °C) (19)  Temp Source: Tympanic (19)  Height: 5' 7 2" (170 7 cm) (19)  Weight - Scale: 115 kg (254 lb) (19)        Physical Exam   Constitutional: She is oriented to person, place, and time  She appears well-developed  HENT:   Head: Normocephalic  Eyes: EOM are normal    Neck: Normal range of motion  Cardiovascular: Normal rate and normal heart sounds  Pulmonary/Chest: Effort normal and breath sounds normal    Abdominal: Soft  She exhibits no distension  There is no tenderness  There is no rebound  Musculoskeletal: Normal range of motion  Neurological: She is alert and oriented to person, place, and time  Skin: Skin is warm and dry  Psychiatric: She has a normal mood and affect  Her behavior is normal  Judgment and thought content normal        Lab Results: I have personally reviewed pertinent lab results  Imaging: I have personally reviewed pertinent reports  EKG, Pathology, and Other Studies: I have personally reviewed pertinent reports  Code Status: Level 1    Assessment:  48 y o  yo morbidly obese female found to be a good candidate to undergo a weight loss operation upon being enrolled here at the [address] [place] [address]  She has completed all of the preoperative process for bariatric surgery  Plan:  - Plan for laparoscopic possible open SG with HHR with intraoperative EGD  - I discussed the risk of rescheduling/canceling the case if goal weight not met    - She must be at 253# day of surgery  - consent signed  - preop orders placed

## 2019-08-12 ENCOUNTER — TELEPHONE (OUTPATIENT)
Dept: BARIATRICS | Facility: CLINIC | Age: 54
End: 2019-08-12

## 2019-08-12 NOTE — TELEPHONE ENCOUNTER
I called [facility] and spoke to Al to confirm if scripts were ready for  (Lovenox and Oxycodone), both scripts are ready

## 2019-08-12 NOTE — TELEPHONE ENCOUNTER
Pre-op call was made to patient, message left, to follow up on how they are doing and to remind them to continue with all medical and dietary directions that were given at pre-op class regarding liver shrinking diet and hydration  They were encouraged to purchase all vitamins and protein shakes for post op use as well as to begin Miralax three days prior to surgery as directed in Section 6 of their manual   They were reminded of the Ensure Pre-surgery drinks protocol and to bring their completed yellow form with them to surgery as well as their CPAP-BiPAP machine if they use one  Lastly, they were informed that they would be weighed the morning of surgery and to give the office a call if they had any further questions or concerns

## 2019-08-13 RX ORDER — PRASTERONE (DHEA) 50 MG
TABLET ORAL
COMMUNITY

## 2019-08-13 RX ORDER — CALCIUM POLYCARBOPHIL 625 MG 625 MG/1
625 TABLET ORAL DAILY
COMMUNITY

## 2019-08-13 NOTE — PRE-PROCEDURE INSTRUCTIONS
My Surgical Experience    The following information was developed to assist you to prepare for your operation  What do I need to do before coming to the hospital?   Arrange for a responsible person to drive you to and from the hospital    Arrange care for your children at home  Children are not allowed in the recovery areas of the hospital   Plan to wear clothing that is easy to put on and take off  If you are having shoulder surgery, wear a shirt that buttons or zippers in the front  Bathing  o Shower the evening before and the morning of your surgery with an antibacterial soap  Please refer to the Pre Op Showering Instructions for Surgery Patients Sheet   o Remove nail polish and all body piercing jewelry  o Do not shave any body part for at least 24 hours before surgery-this includes face, arms, legs and upper body  Food  o Nothing to eat or drink after midnight the night before your surgery  This includes candy and chewing gum  o Exception: If your surgery is after 12:00pm (noon), you may have clear liquids such as 7-Up®, ginger ale, apple or cranberry juice, Jell-O®, water, or clear broth until 8:00 am  o Do not drink milk or juice with pulp on the morning before surgery  o Do not drink alcohol 24 hours before surgery  Medicine  o Follow instructions you received from your surgeon about which medicines you may take on the day of surgery  o If instructed to take medicine on the morning of surgery, take pills with just a small sip of water  Call your prescribing doctor for specific infroamtion on what to do if you take insulin    What should I bring to the hospital?    Bring:  Lizzette Adams or a walker, if you have them, for foot or knee surgery   A list of the daily medicines, vitamins, minerals, herbals and nutritional supplements you take   Include the dosages of medicines and the time you take them each day   Glasses, dentures or hearing aids   Minimal clothing; you will be wearing hospital sleepwear   Photo ID; required to verify your identity   If you have a Living Will or Power of , bring a copy of the documents   If you have an ostomy, bring an extra pouch and any supplies you use    Do not bring   Medicines or inhalers   Money, valuables or jewelry    What other information should I know about the day of surgery?  Notify your surgeons if you develop a cold, sore throat, cough, fever, rash or any other illness   Report to the Ambulatory Surgical/Same Day Surgery Unit   You will be instructed to stop at Registration only if you have not been pre-registered   Inform your  fi they do not stay that they will be asked by the staff to leave a phone number where they can be reached   Be available to be reached before surgery  In the event the operating room schedule changes, you may be asked to come in earlier or later than expected    *It is important to tell your doctor and others involved in your health care if you are taking or have been taking any non-prescription drugs, vitamins, minerals, herbals or other nutritional supplements  Any of these may interact with some food or medicines and cause a reaction      Pre-Surgery Instructions:   Medication Instructions    Alpha-Lipoic Acid 100 MG CAPS Instructed patient per Anesthesia Guidelines   Ascorbic Acid (VITAMIN C) 1000 MG tablet Instructed patient per Anesthesia Guidelines   Biotin 5000 MCG CAPS Instructed patient per Anesthesia Guidelines   calcium gluconate 500 mg tablet Instructed patient per Anesthesia Guidelines   calcium polycarbophil (FIBERCON) 625 mg tablet Instructed patient per Anesthesia Guidelines   Cholecalciferol (VITAMIN D3) 5000 units TABS Instructed patient per Anesthesia Guidelines   co-enzyme Q-10 30 MG capsule Instructed patient per Anesthesia Guidelines   Collagen 500 MG CAPS Instructed patient per Anesthesia Guidelines      DHEA 50 MG TABS Instructed patient per Anesthesia Guidelines   escitalopram (LEXAPRO) 20 mg tablet Instructed patient per Anesthesia Guidelines   FOLIC ACID PO Instructed patient per Anesthesia Guidelines   GLUCOS-REINA-MSM-LN-V-RXIN-SECU PO Instructed patient per Anesthesia Guidelines   Green Tea, Camillia sinensis, (GREEN TEA EXTRACT PO) Instructed patient per Anesthesia Guidelines   milk thistle 175 MG tablet Instructed patient per Anesthesia Guidelines   Misc Natural Products (PUMPKIN SEED OIL PO) Instructed patient per Anesthesia Guidelines   Misc Natural Products (TUMERSAID PO) Instructed patient per Anesthesia Guidelines   NON FORMULARY Instructed patient per Anesthesia Guidelines   omeprazole (PriLOSEC) 40 MG capsule Instructed patient per Anesthesia Guidelines   vitamin A 10,000 units capsule Instructed patient per Anesthesia Guidelines   vitamin E, tocopherol, 1,000 units capsule Instructed patient per Anesthesia Guidelines   vitamin k 100 MCG tablet Instructed patient per Anesthesia Guidelines   zinc gluconate 50 mg tablet Instructed patient per Anesthesia Guidelines  To take lexapro and omeprazole a m   Of surgery

## 2019-08-15 ENCOUNTER — TELEPHONE (OUTPATIENT)
Dept: BARIATRICS | Facility: CLINIC | Age: 54
End: 2019-08-15

## 2019-08-15 NOTE — TELEPHONE ENCOUNTER
Pt called and wanted to know if it was safe for her to get the shingles vaccine today  Pt is scheduled for the gastric sleeve on Monday Aug 19 with Dr Gotti  I spoke with Henny ARMENTA at [facility] and she confirmed with Alphonso Welch and he stated this was fine  I called pt to inform her it was safe to receive the Shingles vaccine

## 2019-08-18 ENCOUNTER — ANESTHESIA EVENT (OUTPATIENT)
Dept: PERIOP | Facility: HOSPITAL | Age: 54
DRG: 621 | End: 2019-08-18
Payer: COMMERCIAL

## 2019-08-19 ENCOUNTER — ANESTHESIA (OUTPATIENT)
Dept: PERIOP | Facility: HOSPITAL | Age: 54
DRG: 621 | End: 2019-08-19
Payer: COMMERCIAL

## 2019-08-19 ENCOUNTER — HOSPITAL ENCOUNTER (INPATIENT)
Facility: HOSPITAL | Age: 54
LOS: 1 days | Discharge: HOME | DRG: 621 | End: 2019-08-20
Attending: SURGERY | Admitting: SURGERY
Payer: COMMERCIAL

## 2019-08-19 DIAGNOSIS — E66.01 MORBID OBESITY (HCC): ICD-10-CM

## 2019-08-19 DIAGNOSIS — Z98.84 S/P LAPAROSCOPIC SLEEVE GASTRECTOMY: Primary | ICD-10-CM

## 2019-08-19 DIAGNOSIS — K21.9 GASTROESOPHAGEAL REFLUX DISEASE: ICD-10-CM

## 2019-08-19 DIAGNOSIS — G47.30 SLEEP APNEA: ICD-10-CM

## 2019-08-19 LAB
ABO GROUP BLD: NORMAL
ABO GROUP BLD: NORMAL
BLD GP AB SCN SERPL QL: NEGATIVE
EXT PREGNANCY TEST URINE: NEGATIVE
EXT. CONTROL: NORMAL
RH BLD: POSITIVE
RH BLD: POSITIVE
SPECIMEN EXPIRATION DATE: NORMAL

## 2019-08-19 PROCEDURE — 86900 BLOOD TYPING SEROLOGIC ABO: CPT | Performed by: PHYSICIAN ASSISTANT

## 2019-08-19 PROCEDURE — 81025 URINE PREGNANCY TEST: CPT | Performed by: SURGERY

## 2019-08-19 PROCEDURE — 43281 LAP PARAESOPHAG HERN REPAIR: CPT | Performed by: SURGERY

## 2019-08-19 PROCEDURE — 0DB64Z3 EXCISION OF STOMACH, PERCUTANEOUS ENDOSCOPIC APPROACH, VERTICAL: ICD-10-PCS | Performed by: SURGERY

## 2019-08-19 PROCEDURE — 43775 LAP SLEEVE GASTRECTOMY: CPT | Performed by: SURGERY

## 2019-08-19 PROCEDURE — 86901 BLOOD TYPING SEROLOGIC RH(D): CPT | Performed by: SURGERY

## 2019-08-19 PROCEDURE — 0BQT4ZZ REPAIR DIAPHRAGM, PERCUTANEOUS ENDOSCOPIC APPROACH: ICD-10-PCS | Performed by: SURGERY

## 2019-08-19 PROCEDURE — 88307 TISSUE EXAM BY PATHOLOGIST: CPT | Performed by: PATHOLOGY

## 2019-08-19 PROCEDURE — 86900 BLOOD TYPING SEROLOGIC ABO: CPT | Performed by: SURGERY

## 2019-08-19 PROCEDURE — 0DJ08ZZ INSPECTION OF UPPER INTESTINAL TRACT, VIA NATURAL OR ARTIFICIAL OPENING ENDOSCOPIC: ICD-10-PCS | Performed by: SURGERY

## 2019-08-19 PROCEDURE — C9290 INJ, BUPIVACAINE LIPOSOME: HCPCS | Performed by: SURGERY

## 2019-08-19 PROCEDURE — 86901 BLOOD TYPING SEROLOGIC RH(D): CPT | Performed by: PHYSICIAN ASSISTANT

## 2019-08-19 PROCEDURE — 86850 RBC ANTIBODY SCREEN: CPT | Performed by: SURGERY

## 2019-08-19 PROCEDURE — C9290 INJ, BUPIVACAINE LIPOSOME: HCPCS

## 2019-08-19 DEVICE — SEAMGUARD STPL REINF ENDO GIA ULTRA UNIV 60 PURPLE: Type: IMPLANTABLE DEVICE | Status: FUNCTIONAL

## 2019-08-19 DEVICE — SEAMGUARD STPL REINF ENDO GIA ULTRA UNV 60 BLACK: Type: IMPLANTABLE DEVICE | Status: FUNCTIONAL

## 2019-08-19 RX ORDER — KETOROLAC TROMETHAMINE 30 MG/ML
15 INJECTION, SOLUTION INTRAMUSCULAR; INTRAVENOUS EVERY 6 HOURS SCHEDULED
Status: DISCONTINUED | OUTPATIENT
Start: 2019-08-19 | End: 2019-08-20 | Stop reason: HOSPADM

## 2019-08-19 RX ORDER — LABETALOL 20 MG/4 ML (5 MG/ML) INTRAVENOUS SYRINGE
5 EVERY 4 HOURS PRN
Status: DISCONTINUED | OUTPATIENT
Start: 2019-08-19 | End: 2019-08-20 | Stop reason: HOSPADM

## 2019-08-19 RX ORDER — SODIUM CHLORIDE, SODIUM LACTATE, POTASSIUM CHLORIDE, CALCIUM CHLORIDE 600; 310; 30; 20 MG/100ML; MG/100ML; MG/100ML; MG/100ML
INJECTION, SOLUTION INTRAVENOUS CONTINUOUS PRN
Status: DISCONTINUED | OUTPATIENT
Start: 2019-08-19 | End: 2019-08-19 | Stop reason: SURG

## 2019-08-19 RX ORDER — SODIUM CHLORIDE 9 MG/ML
INJECTION, SOLUTION INTRAVENOUS CONTINUOUS PRN
Status: DISCONTINUED | OUTPATIENT
Start: 2019-08-19 | End: 2019-08-19 | Stop reason: SURG

## 2019-08-19 RX ORDER — GABAPENTIN 300 MG/1
600 CAPSULE ORAL ONCE
Status: COMPLETED | OUTPATIENT
Start: 2019-08-19 | End: 2019-08-19

## 2019-08-19 RX ORDER — SCOLOPAMINE TRANSDERMAL SYSTEM 1 MG/1
1 PATCH, EXTENDED RELEASE TRANSDERMAL ONCE
Status: DISCONTINUED | OUTPATIENT
Start: 2019-08-19 | End: 2019-08-20

## 2019-08-19 RX ORDER — DEXAMETHASONE SODIUM PHOSPHATE 4 MG/ML
INJECTION, SOLUTION INTRA-ARTICULAR; INTRALESIONAL; INTRAMUSCULAR; INTRAVENOUS; SOFT TISSUE AS NEEDED
Status: DISCONTINUED | OUTPATIENT
Start: 2019-08-19 | End: 2019-08-19 | Stop reason: SURG

## 2019-08-19 RX ORDER — HYDROMORPHONE HCL/PF 1 MG/ML
0.5 SYRINGE (ML) INJECTION
Status: DISCONTINUED | OUTPATIENT
Start: 2019-08-19 | End: 2019-08-19 | Stop reason: HOSPADM

## 2019-08-19 RX ORDER — SODIUM CHLORIDE, SODIUM LACTATE, POTASSIUM CHLORIDE, CALCIUM CHLORIDE 600; 310; 30; 20 MG/100ML; MG/100ML; MG/100ML; MG/100ML
100 INJECTION, SOLUTION INTRAVENOUS CONTINUOUS
Status: DISCONTINUED | OUTPATIENT
Start: 2019-08-19 | End: 2019-08-19

## 2019-08-19 RX ORDER — PROPOFOL 10 MG/ML
INJECTION, EMULSION INTRAVENOUS CONTINUOUS PRN
Status: DISCONTINUED | OUTPATIENT
Start: 2019-08-19 | End: 2019-08-19 | Stop reason: SURG

## 2019-08-19 RX ORDER — SIMETHICONE 80 MG
80 TABLET,CHEWABLE ORAL 4 TIMES DAILY PRN
Status: DISCONTINUED | OUTPATIENT
Start: 2019-08-19 | End: 2019-08-20 | Stop reason: HOSPADM

## 2019-08-19 RX ORDER — HYDROMORPHONE HYDROCHLORIDE 2 MG/1
1 TABLET ORAL EVERY 4 HOURS PRN
Status: DISCONTINUED | OUTPATIENT
Start: 2019-08-19 | End: 2019-08-20 | Stop reason: HOSPADM

## 2019-08-19 RX ORDER — FENTANYL CITRATE 50 UG/ML
INJECTION, SOLUTION INTRAMUSCULAR; INTRAVENOUS AS NEEDED
Status: DISCONTINUED | OUTPATIENT
Start: 2019-08-19 | End: 2019-08-19 | Stop reason: SURG

## 2019-08-19 RX ORDER — PROMETHAZINE HYDROCHLORIDE 25 MG/ML
25 INJECTION, SOLUTION INTRAMUSCULAR; INTRAVENOUS ONCE AS NEEDED
Status: COMPLETED | OUTPATIENT
Start: 2019-08-19 | End: 2019-08-19

## 2019-08-19 RX ORDER — ACETAMINOPHEN 325 MG/1
975 TABLET ORAL EVERY 6 HOURS SCHEDULED
Status: DISCONTINUED | OUTPATIENT
Start: 2019-08-19 | End: 2019-08-20 | Stop reason: HOSPADM

## 2019-08-19 RX ORDER — SODIUM CHLORIDE, SODIUM LACTATE, POTASSIUM CHLORIDE, CALCIUM CHLORIDE 600; 310; 30; 20 MG/100ML; MG/100ML; MG/100ML; MG/100ML
75 INJECTION, SOLUTION INTRAVENOUS CONTINUOUS
Status: DISCONTINUED | OUTPATIENT
Start: 2019-08-19 | End: 2019-08-20 | Stop reason: HOSPADM

## 2019-08-19 RX ORDER — CEFAZOLIN SODIUM 2 G/50ML
2000 SOLUTION INTRAVENOUS ONCE
Status: COMPLETED | OUTPATIENT
Start: 2019-08-19 | End: 2019-08-19

## 2019-08-19 RX ORDER — MORPHINE SULFATE 4 MG/ML
4 INJECTION, SOLUTION INTRAMUSCULAR; INTRAVENOUS EVERY 2 HOUR PRN
Status: DISCONTINUED | OUTPATIENT
Start: 2019-08-19 | End: 2019-08-20 | Stop reason: HOSPADM

## 2019-08-19 RX ORDER — MIDAZOLAM HYDROCHLORIDE 1 MG/ML
INJECTION INTRAMUSCULAR; INTRAVENOUS AS NEEDED
Status: DISCONTINUED | OUTPATIENT
Start: 2019-08-19 | End: 2019-08-19 | Stop reason: SURG

## 2019-08-19 RX ORDER — PROPOFOL 10 MG/ML
INJECTION, EMULSION INTRAVENOUS AS NEEDED
Status: DISCONTINUED | OUTPATIENT
Start: 2019-08-19 | End: 2019-08-19 | Stop reason: SURG

## 2019-08-19 RX ORDER — HYDROMORPHONE HYDROCHLORIDE 2 MG/ML
INJECTION, SOLUTION INTRAMUSCULAR; INTRAVENOUS; SUBCUTANEOUS AS NEEDED
Status: DISCONTINUED | OUTPATIENT
Start: 2019-08-19 | End: 2019-08-19 | Stop reason: SURG

## 2019-08-19 RX ORDER — SODIUM CHLORIDE, SODIUM LACTATE, POTASSIUM CHLORIDE, CALCIUM CHLORIDE 600; 310; 30; 20 MG/100ML; MG/100ML; MG/100ML; MG/100ML
125 INJECTION, SOLUTION INTRAVENOUS CONTINUOUS
Status: DISCONTINUED | OUTPATIENT
Start: 2019-08-19 | End: 2019-08-19

## 2019-08-19 RX ORDER — LABETALOL 20 MG/4 ML (5 MG/ML) INTRAVENOUS SYRINGE
10 EVERY 6 HOURS PRN
Status: DISCONTINUED | OUTPATIENT
Start: 2019-08-19 | End: 2019-08-20 | Stop reason: HOSPADM

## 2019-08-19 RX ORDER — ONDANSETRON 2 MG/ML
4 INJECTION INTRAMUSCULAR; INTRAVENOUS EVERY 6 HOURS PRN
Status: DISCONTINUED | OUTPATIENT
Start: 2019-08-19 | End: 2019-08-20 | Stop reason: HOSPADM

## 2019-08-19 RX ORDER — ROCURONIUM BROMIDE 10 MG/ML
INJECTION, SOLUTION INTRAVENOUS AS NEEDED
Status: DISCONTINUED | OUTPATIENT
Start: 2019-08-19 | End: 2019-08-19 | Stop reason: SURG

## 2019-08-19 RX ORDER — ESCITALOPRAM OXALATE 10 MG/1
10 TABLET ORAL DAILY
Status: DISCONTINUED | OUTPATIENT
Start: 2019-08-19 | End: 2019-08-20 | Stop reason: HOSPADM

## 2019-08-19 RX ORDER — VALACYCLOVIR HYDROCHLORIDE 500 MG/1
1000 TABLET, FILM COATED ORAL EVERY 12 HOURS SCHEDULED
Status: DISCONTINUED | OUTPATIENT
Start: 2019-08-19 | End: 2019-08-19

## 2019-08-19 RX ORDER — CELECOXIB 100 MG/1
200 CAPSULE ORAL ONCE
Status: COMPLETED | OUTPATIENT
Start: 2019-08-19 | End: 2019-08-19

## 2019-08-19 RX ORDER — VALACYCLOVIR HYDROCHLORIDE 500 MG/1
2000 TABLET, FILM COATED ORAL EVERY 12 HOURS SCHEDULED
Status: DISCONTINUED | OUTPATIENT
Start: 2019-08-19 | End: 2019-08-20 | Stop reason: HOSPADM

## 2019-08-19 RX ORDER — HYDROMORPHONE HYDROCHLORIDE 2 MG/1
2 TABLET ORAL EVERY 4 HOURS PRN
Status: DISCONTINUED | OUTPATIENT
Start: 2019-08-19 | End: 2019-08-20 | Stop reason: HOSPADM

## 2019-08-19 RX ORDER — ACETAMINOPHEN 325 MG/1
975 TABLET ORAL ONCE
Status: COMPLETED | OUTPATIENT
Start: 2019-08-19 | End: 2019-08-19

## 2019-08-19 RX ADMIN — SODIUM CHLORIDE: 0.9 INJECTION, SOLUTION INTRAVENOUS at 11:45

## 2019-08-19 RX ADMIN — ROCURONIUM BROMIDE 50 MG: 10 INJECTION INTRAVENOUS at 11:40

## 2019-08-19 RX ADMIN — Medication 0.2 MCG/KG/MIN: at 11:45

## 2019-08-19 RX ADMIN — SODIUM CHLORIDE, SODIUM LACTATE, POTASSIUM CHLORIDE, AND CALCIUM CHLORIDE 125 ML/HR: .6; .31; .03; .02 INJECTION, SOLUTION INTRAVENOUS at 08:20

## 2019-08-19 RX ADMIN — FAMOTIDINE 20 MG: 10 INJECTION, SOLUTION INTRAVENOUS at 17:26

## 2019-08-19 RX ADMIN — CEFAZOLIN SODIUM 2000 MG: 2 SOLUTION INTRAVENOUS at 11:33

## 2019-08-19 RX ADMIN — HYDROMORPHONE HYDROCHLORIDE 1 MG: 2 INJECTION, SOLUTION INTRAMUSCULAR; INTRAVENOUS; SUBCUTANEOUS at 12:43

## 2019-08-19 RX ADMIN — FENTANYL CITRATE 100 MCG: 50 INJECTION, SOLUTION INTRAMUSCULAR; INTRAVENOUS at 11:40

## 2019-08-19 RX ADMIN — ENOXAPARIN SODIUM 40 MG: 40 INJECTION SUBCUTANEOUS at 08:06

## 2019-08-19 RX ADMIN — PHENYLEPHRINE HYDROCHLORIDE 100 MCG: 10 INJECTION INTRAVENOUS at 12:07

## 2019-08-19 RX ADMIN — SUGAMMADEX 150 MG: 100 INJECTION, SOLUTION INTRAVENOUS at 13:02

## 2019-08-19 RX ADMIN — PROPOFOL 200 MG: 10 INJECTION, EMULSION INTRAVENOUS at 11:40

## 2019-08-19 RX ADMIN — ACETAMINOPHEN 975 MG: 325 TABLET, FILM COATED ORAL at 23:32

## 2019-08-19 RX ADMIN — CELECOXIB 200 MG: 100 CAPSULE ORAL at 08:05

## 2019-08-19 RX ADMIN — ACETAMINOPHEN 975 MG: 325 TABLET, FILM COATED ORAL at 18:41

## 2019-08-19 RX ADMIN — ROCURONIUM BROMIDE 30 MG: 10 INJECTION INTRAVENOUS at 12:37

## 2019-08-19 RX ADMIN — ACETAMINOPHEN 975 MG: 325 TABLET, FILM COATED ORAL at 08:04

## 2019-08-19 RX ADMIN — DEXAMETHASONE SODIUM PHOSPHATE 4 MG: 4 INJECTION, SOLUTION INTRA-ARTICULAR; INTRALESIONAL; INTRAMUSCULAR; INTRAVENOUS; SOFT TISSUE at 11:53

## 2019-08-19 RX ADMIN — MIDAZOLAM HYDROCHLORIDE 2 MG: 1 INJECTION, SOLUTION INTRAMUSCULAR; INTRAVENOUS at 11:29

## 2019-08-19 RX ADMIN — PROPOFOL 100 MG: 10 INJECTION, EMULSION INTRAVENOUS at 11:59

## 2019-08-19 RX ADMIN — HYDROMORPHONE HYDROCHLORIDE 1 MG: 2 INJECTION, SOLUTION INTRAMUSCULAR; INTRAVENOUS; SUBCUTANEOUS at 13:10

## 2019-08-19 RX ADMIN — LIDOCAINE HYDROCHLORIDE 100 MG: 20 INJECTION, SOLUTION INTRAVENOUS at 11:40

## 2019-08-19 RX ADMIN — PROPOFOL 100 MCG/KG/MIN: 10 INJECTION, EMULSION INTRAVENOUS at 11:44

## 2019-08-19 RX ADMIN — SCOPALAMINE 1 PATCH: 1 PATCH, EXTENDED RELEASE TRANSDERMAL at 08:06

## 2019-08-19 RX ADMIN — KETOROLAC TROMETHAMINE 15 MG: 30 INJECTION, SOLUTION INTRAMUSCULAR at 23:33

## 2019-08-19 RX ADMIN — SODIUM CHLORIDE, SODIUM LACTATE, POTASSIUM CHLORIDE, AND CALCIUM CHLORIDE 125 ML/HR: .6; .31; .03; .02 INJECTION, SOLUTION INTRAVENOUS at 08:21

## 2019-08-19 RX ADMIN — Medication 0.2 MCG/KG/HR: at 11:45

## 2019-08-19 RX ADMIN — SODIUM CHLORIDE, SODIUM LACTATE, POTASSIUM CHLORIDE, AND CALCIUM CHLORIDE: .6; .31; .03; .02 INJECTION, SOLUTION INTRAVENOUS at 11:22

## 2019-08-19 RX ADMIN — SODIUM CHLORIDE, SODIUM LACTATE, POTASSIUM CHLORIDE, AND CALCIUM CHLORIDE 75 ML/HR: .6; .31; .03; .02 INJECTION, SOLUTION INTRAVENOUS at 16:02

## 2019-08-19 RX ADMIN — KETOROLAC TROMETHAMINE 15 MG: 30 INJECTION, SOLUTION INTRAMUSCULAR at 17:25

## 2019-08-19 RX ADMIN — PROMETHAZINE HYDROCHLORIDE 25 MG: 25 INJECTION INTRAMUSCULAR; INTRAVENOUS at 13:50

## 2019-08-19 RX ADMIN — GABAPENTIN 600 MG: 300 CAPSULE ORAL at 08:06

## 2019-08-19 RX ADMIN — ESCITALOPRAM OXALATE 10 MG: 10 TABLET ORAL at 22:31

## 2019-08-19 NOTE — INTERVAL H&P NOTE
H&P reviewed  After examining the patient I find no changes in the patients condition since the H&P had been written      Wt 113 kg (249 lb)   BMI 38 77 kg/m²

## 2019-08-19 NOTE — PLAN OF CARE
Problem: RESPIRATORY - ADULT  Goal: Achieves optimal ventilation and oxygenation  Description  INTERVENTIONS:  - Assess for changes in respiratory status  - Assess for changes in mentation and behavior  - Position to facilitate oxygenation and minimize respiratory effort  - Oxygen administered by appropriate delivery if ordered  - Initiate smoking cessation education as indicated  - Encourage broncho-pulmonary hygiene including cough, deep breathe, Incentive Spirometry  - Assess the need for suctioning and aspirate as needed  - Assess and instruct to report SOB or any respiratory difficulty  - Respiratory Therapy support as indicated  - will use her own CPAP Hs for [facility]   Outcome: Progressing     Problem: Potential for Falls  Goal: Patient will remain free of falls  Description  INTERVENTIONS:  - Assess patient frequently for physical needs  -  Identify cognitive and physical deficits and behaviors that affect risk of falls    -  Butler fall precautions as indicated by assessment   - Educate patient/family on patient safety including physical limitations  - Instruct patient to call for assistance with activity based on assessment  - Modify environment to reduce risk of injury  - Consider OT/PT consult to assist with strengthening/mobility  Outcome: Progressing     Problem: CARDIOVASCULAR - ADULT  Goal: Absence of cardiac dysrhythmias or at baseline rhythm  Description  INTERVENTIONS:  - Continuous cardiac monitoring, vital signs, obtain 12 lead EKG if ordered  - Administer antiarrhythmic and heart rate control medications as ordered  - Monitor electrolytes and administer replacement therapy as ordered  Outcome: Progressing     Problem: GASTROINTESTINAL - ADULT  Goal: Minimal or absence of nausea and/or vomiting  Description  INTERVENTIONS:  - Administer IV fluids if ordered to ensure adequate hydration  - Maintain NPO status until nausea and vomiting are resolved  - Nasogastric tube if ordered  - Administer ordered antiemetic medications as needed  - Provide nonpharmacologic comfort measures as appropriate  - Advance diet as tolerated, if ordered  - Consider nutrition services referral to assist patient with adequate nutrition and appropriate food choices  Outcome: Progressing  Goal: Maintains or returns to baseline bowel function  Description  INTERVENTIONS:  - Assess bowel function  - Encourage oral fluids to ensure adequate hydration  - Administer IV fluids if ordered to ensure adequate hydration  - Administer ordered medications as needed  - Encourage mobilization and activity  - Consider nutritional services referral to assist patient with adequate nutrition and appropriate food choices  Outcome: Progressing     Problem: SKIN/TISSUE INTEGRITY - ADULT  Goal: Incision(s), wounds(s) or drain site(s) healing without S/S of infection  Description  INTERVENTIONS  - Assess and document risk factors for skin impairment   - Assess and document dressing, incision, wound bed, drain sites and surrounding tissue  - Consider nutrition services referral as needed  - Oral mucous membranes remain intact  - Provide patient/ family education  Outcome: Progressing

## 2019-08-19 NOTE — ANESTHESIA PREPROCEDURE EVALUATION
Review of Systems/Medical History  Patient summary reviewed  Chart reviewed      Cardiovascular  Negative cardio ROS    Pulmonary  Asthma , well controlled/ stable , Shortness of breath, Sleep apnea CPAP,        GI/Hepatic    GERD ,  Hiatal hernia,        Negative  ROS        Endo/Other  Negative endo/other ROS      GYN  Negative gynecology ROS          Hematology  Negative hematology ROS      Musculoskeletal    Arthritis     Neurology  Negative neurology ROS   Headaches,    Psychology   Anxiety, Depression , being treated for depression,              Physical Exam    Airway    Mallampati score: III  TM Distance: >3 FB  Neck ROM: full     Dental   No notable dental hx     Cardiovascular  Comment: Negative ROS, Rhythm: regular, Rate: normal, Cardiovascular exam normal    Pulmonary  Pulmonary exam normal Breath sounds clear to auscultation,     Other Findings        Anesthesia Plan  ASA Score- 3     Anesthesia Type- general with ASA Monitors  Additional Monitors:   Airway Plan: ETT  Plan Factors-    Induction- intravenous  Postoperative Plan- Plan for postoperative opioid use  Informed Consent- Anesthetic plan and risks discussed with patient and sibling  I personally reviewed this patient with the CRNA  Discussed and agreed on the Anesthesia Plan with the CRNA  Shavon Bennett

## 2019-08-19 NOTE — OP NOTE
OPERATIVE REPORT  PATIENT NAME: Ruthann Jesus    :  1965  MRN: [id number]  Pt Location: [facility] OR ROOM 02    SURGERY DATE: 2019    Surgeon(s) and Role:  Panel 1:     * Prakash Parson MD - Primary     * Lizet Mayorga PA-C - Assisting  Panel 2:     * Prakash Parson MD - Primary     * Nate Romero MD - Assisting     * Lizet Mayorga PA-C - Assisting    Preop Diagnosis:  Morbid obesity ([address] ) [E66 01]  Sleep apnea [G47 30]  Gastroesophageal reflux disease [K21 9]    Post-Op Diagnosis Codes:     * Morbid obesity ([address] ) [E66 01]     * Sleep apnea [G47 30]     * Gastroesophageal reflux disease [K21 9]    Procedure(s) (LRB):  LAPAROSCOPIC SLEEVE GASTRECTOMY (N/A)  REPAIR HERNIA PARAESOPHAGEAL  LAPAROSCOPIC (N/A)    Specimen(s):  ID Type Source Tests Collected by Time Destination   1 : Portion of stomach Tissue Stomach TISSUE EXAM Prakash Parson MD 2019 1219        Estimated Blood Loss:   20cc    Drains:  [REMOVED] NG/OG/Enteral Tube Orogastric 18 Fr Right mouth (Removed)   Number of days: 0       Anesthesia Type:   General    Operative Indications: Morbid obesity ([address] ) [E66 01]  Sleep apnea [G47 30]  Gastroesophageal reflux disease [K21 9]  Paraesophageal hernia    Operative Findings:  Paraesophageal hernia    Complications:   None    Procedure and Technique:  INDICATION:    Ruthann Jesus is a 48 y o  female with a Body mass index is 38 69 kg/m²  and a long standing history of morbid obesity and inability to lose a significant amount of weight on its own  This patient was found to be a good candidate to undergo a bariatric procedure upon being enrolled here at the [place] [address] [facility] in [facility]  OPERATIVE TECHNIQUE    The patient was taken to the operating room and placed in a supine position  A dose of IV antibiotic prophylaxis that consisted of Ancef 2g was given  Also, 40mg of lovenox to prevent DVT were administered   Sequential compression devices were placed on both lower extremities  After satisfactory general anesthesia induction and endotracheal intubation was achieved, the extremities were secured to prevent neurovascular and musculoskeletal injuries as best as possible  Subsequently, the abdominal wall was prepped and draped in a surgical standard sterile fashion  After a timeout was done and the patient was properly identified and the type of procedure was confirmed a LUQ transverse skin incision was made, and the subcutaneous tissues dissected  A veress needle technique was used for access to the peritoneal cavity and pneumoperitoneum was created to 15mmHg  Access to the peritoneal cavity was gained with an 12mm optical trocar  With this device, we were able to visualize the layers of the abdominal wall, and enter the peritoneal cavity under direct visualization  A 4 quadrant transverus abdominus plane block was performed under direct laparoscopic vision  Under direct laparoscopic visualization, four additional trocars were placed: a 5 mm in the right upper quadrant subcostal position in the anterior axillary line, a 15-mm port was placed in the right flank midclavicular line, a 12-mm port was placed in the left flank position in the midclavicular line and another 12-mm port was placed in the suprumbilical position to the patient's left of the midline  The T-Roth liver retractor was placed in the subxiphoid position through the use of a 5-mm trocar incision  The patient was repositioned to a reverse Trendelenburg position  Upon elevating the liver we noticed that this patient had a bilateral circumferential para-esophageal hernia  We started to dissect the hernia sac anteriorly and laterally with the energy dissector  The G-E junction was reduced back into its normal intra-abdominal location   We then encircled the upper portion of the stomach and providing lateral traction allowed us to visualize the confluence of the duke of the diaphragm and to complete the inferior and posterior dissection of the hernia sac  Subsequently we proceeded to repair the crural defect with interrupted stitches of 2-0 non absorbable suture  The crura were approximated without difficulty and no mesh was placed  There was good hemostasis and the GE junction was back into the intra-abdominal cavity  We proceeded to divide the gastrocolic ligament with the energy device to enter the lesser sac  We continued to divide this ligament along the greater curvature of the stomach towards the angle of His  Special care was taken while dividing the short gastric vessels close to the spleen  This process was completely hemostatic  We then turned to the creation of an elongated and thin gastric pouch  A 36 Ivorian calibration tube was placed by the anesthesia staff into the stomach under our laparoscopic surveillance  Once the tip of the bougie was confirmed to be next to the pylorus, serial firings of the laparoscopic stapler with 60-mm cartridges were utilized  We started in a point inferior to the incisura angularis and the Crows foot nerve looking to preserve the gastric emptying  This was 5 to 6 centimeters proximal to the pylorus  The staple lines were reinforced with buttressing material  We created a pouch based on the lesser curve, and in a semi vertical orientation  We continued the vertical serial firings of the stapler to the angle of His gently cinching the bougie with our laparoscopic stapler looking to create a thin pouch  As we approached the fundus of the stomach and the angle of His, the stapler loads were changed appropriately according to the variable thickness of the tissue  This completely  the pouch from the remnant stomach  We then turned our attention to the newly created pouch and examined it for bleeding or obvious defects on the staple lines and none were found      The distal stomach pouch was occluded with a Quinton clamp, and an EGD as well as an air insufflation test was performed  Neither intraoperative bleeding nor leaks were detected  The gastric remnant was externalized through the RLQ 15mm trocar site and passed off the surgical field to be sent to pathology  The sponge, needle and instrument count was reported complete  The 93ZZ periumbilical camera trocar site and 15mm trocar site were then closed with use of a suture closure device and a figure-of-eight with absorbable suture  The liver retractor and the remainder ports were then removed under direct laparoscopic visualization and no back bleeding was noted  The skin incisions were all closed with 4-0 absorbable subcuticular suture  The patient tolerated the procedure well, was extubated uneventfully and was transferred to the recovery room in stable condition  I was present for the entire length of the procedure as the attending of record  No qualified resident was available to assist   The presence of an assistant was necessary for camera holding, traction and counter traction and for help with suturing and stapling in addition to performing the intraop-EGD     I was present for the entire procedure and A qualified resident physician was not available    Patient Disposition:  PACU  and extubated and stable    SIGNATURE: Aixa Cabral MD  DATE: August 19, 2019  TIME: 1:07 PM

## 2019-08-19 NOTE — ANESTHESIA POSTPROCEDURE EVALUATION
Post-Op Assessment Note    CV Status:  Stable  Pain Score: 0    Pain management: adequate     Mental Status:  Alert and awake   Hydration Status:  Euvolemic and stable   PONV Controlled:  Controlled   Airway Patency:  Patent and adequate   Post Op Vitals Reviewed: Yes      Staff: CRNA           BP      Temp      Pulse     Resp      SpO2

## 2019-08-20 ENCOUNTER — TELEPHONE (OUTPATIENT)
Dept: BARIATRICS | Facility: CLINIC | Age: 54
End: 2019-08-20

## 2019-08-20 ENCOUNTER — HOSPITAL ENCOUNTER (OUTPATIENT)
Facility: HOSPITAL | Age: 54
Setting detail: OBSERVATION
Discharge: HOME | End: 2019-08-21
Attending: EMERGENCY MEDICINE | Admitting: SURGERY
Payer: COMMERCIAL

## 2019-08-20 VITALS
TEMPERATURE: 99.7 F | DIASTOLIC BLOOD PRESSURE: 62 MMHG | WEIGHT: 247 LBS | RESPIRATION RATE: 20 BRPM | SYSTOLIC BLOOD PRESSURE: 112 MMHG | BODY MASS INDEX: 38.77 KG/M2 | HEIGHT: 67 IN | HEART RATE: 61 BPM | OXYGEN SATURATION: 98 %

## 2019-08-20 DIAGNOSIS — R11.2 NAUSEA AND VOMITING: Primary | ICD-10-CM

## 2019-08-20 DIAGNOSIS — R11.0 NAUSEA: ICD-10-CM

## 2019-08-20 LAB
ALBUMIN SERPL BCP-MCNC: 3.6 G/DL (ref 3.5–5)
ALP SERPL-CCNC: 57 U/L (ref 46–116)
ALT SERPL W P-5'-P-CCNC: 35 U/L (ref 12–78)
ANION GAP SERPL CALCULATED.3IONS-SCNC: 10 MMOL/L (ref 4–13)
ANION GAP SERPL CALCULATED.3IONS-SCNC: 10 MMOL/L (ref 4–13)
AST SERPL W P-5'-P-CCNC: 23 U/L (ref 5–45)
BASOPHILS # BLD AUTO: 0.03 THOUSANDS/ΜL (ref 0–0.1)
BASOPHILS NFR BLD AUTO: 0 % (ref 0–1)
BILIRUB SERPL-MCNC: 0.4 MG/DL (ref 0.2–1)
BUN SERPL-MCNC: 15 MG/DL (ref 5–25)
BUN SERPL-MCNC: 15 MG/DL (ref 5–25)
CALCIUM SERPL-MCNC: 8.3 MG/DL (ref 8.3–10.1)
CALCIUM SERPL-MCNC: 8.6 MG/DL (ref 8.3–10.1)
CHLORIDE SERPL-SCNC: 103 MMOL/L (ref 100–108)
CHLORIDE SERPL-SCNC: 104 MMOL/L (ref 100–108)
CO2 SERPL-SCNC: 24 MMOL/L (ref 21–32)
CO2 SERPL-SCNC: 25 MMOL/L (ref 21–32)
CREAT SERPL-MCNC: 0.94 MG/DL (ref 0.6–1.3)
CREAT SERPL-MCNC: 0.96 MG/DL (ref 0.6–1.3)
EOSINOPHIL # BLD AUTO: 0.02 THOUSAND/ΜL (ref 0–0.61)
EOSINOPHIL NFR BLD AUTO: 0 % (ref 0–6)
ERYTHROCYTE [DISTWIDTH] IN BLOOD BY AUTOMATED COUNT: 12.9 % (ref 11.6–15.1)
ERYTHROCYTE [DISTWIDTH] IN BLOOD BY AUTOMATED COUNT: 13.2 % (ref 11.6–15.1)
GFR SERPL CREATININE-BSD FRML MDRD: 68 ML/MIN/1.73SQ M
GFR SERPL CREATININE-BSD FRML MDRD: 69 ML/MIN/1.73SQ M
GLUCOSE SERPL-MCNC: 104 MG/DL (ref 65–140)
GLUCOSE SERPL-MCNC: 105 MG/DL (ref 65–140)
HCT VFR BLD AUTO: 38.7 % (ref 34.8–46.1)
HCT VFR BLD AUTO: 40.2 % (ref 34.8–46.1)
HGB BLD-MCNC: 12.7 G/DL (ref 11.5–15.4)
HGB BLD-MCNC: 12.9 G/DL (ref 11.5–15.4)
IMM GRANULOCYTES # BLD AUTO: 0.03 THOUSAND/UL (ref 0–0.2)
IMM GRANULOCYTES NFR BLD AUTO: 0 % (ref 0–2)
LIPASE SERPL-CCNC: 157 U/L (ref 73–393)
LYMPHOCYTES # BLD AUTO: 1.67 THOUSANDS/ΜL (ref 0.6–4.47)
LYMPHOCYTES NFR BLD AUTO: 16 % (ref 14–44)
MCH RBC QN AUTO: 32.2 PG (ref 26.8–34.3)
MCH RBC QN AUTO: 32.4 PG (ref 26.8–34.3)
MCHC RBC AUTO-ENTMCNC: 32.1 G/DL (ref 31.4–37.4)
MCHC RBC AUTO-ENTMCNC: 32.8 G/DL (ref 31.4–37.4)
MCV RBC AUTO: 100 FL (ref 82–98)
MCV RBC AUTO: 99 FL (ref 82–98)
MONOCYTES # BLD AUTO: 0.68 THOUSAND/ΜL (ref 0.17–1.22)
MONOCYTES NFR BLD AUTO: 7 % (ref 4–12)
NEUTROPHILS # BLD AUTO: 7.8 THOUSANDS/ΜL (ref 1.85–7.62)
NEUTS SEG NFR BLD AUTO: 77 % (ref 43–75)
NRBC BLD AUTO-RTO: 0 /100 WBCS
PLATELET # BLD AUTO: 198 THOUSANDS/UL (ref 149–390)
PLATELET # BLD AUTO: 209 THOUSANDS/UL (ref 149–390)
PMV BLD AUTO: 10.6 FL (ref 8.9–12.7)
PMV BLD AUTO: 10.9 FL (ref 8.9–12.7)
POTASSIUM SERPL-SCNC: 3.9 MMOL/L (ref 3.5–5.3)
POTASSIUM SERPL-SCNC: 4.3 MMOL/L (ref 3.5–5.3)
PROT SERPL-MCNC: 7.1 G/DL (ref 6.4–8.2)
RBC # BLD AUTO: 3.92 MILLION/UL (ref 3.81–5.12)
RBC # BLD AUTO: 4.01 MILLION/UL (ref 3.81–5.12)
SODIUM SERPL-SCNC: 137 MMOL/L (ref 136–145)
SODIUM SERPL-SCNC: 139 MMOL/L (ref 136–145)
WBC # BLD AUTO: 10.23 THOUSAND/UL (ref 4.31–10.16)
WBC # BLD AUTO: 9.29 THOUSAND/UL (ref 4.31–10.16)

## 2019-08-20 PROCEDURE — 96375 TX/PRO/DX INJ NEW DRUG ADDON: CPT

## 2019-08-20 PROCEDURE — 85025 COMPLETE CBC W/AUTO DIFF WBC: CPT | Performed by: EMERGENCY MEDICINE

## 2019-08-20 PROCEDURE — 96374 THER/PROPH/DIAG INJ IV PUSH: CPT

## 2019-08-20 PROCEDURE — 85027 COMPLETE CBC AUTOMATED: CPT | Performed by: PHYSICIAN ASSISTANT

## 2019-08-20 PROCEDURE — NC001 PR NO CHARGE: Performed by: SURGERY

## 2019-08-20 PROCEDURE — 36415 COLL VENOUS BLD VENIPUNCTURE: CPT | Performed by: EMERGENCY MEDICINE

## 2019-08-20 PROCEDURE — 99284 EMERGENCY DEPT VISIT MOD MDM: CPT

## 2019-08-20 PROCEDURE — 83690 ASSAY OF LIPASE: CPT | Performed by: EMERGENCY MEDICINE

## 2019-08-20 PROCEDURE — 96361 HYDRATE IV INFUSION ADD-ON: CPT

## 2019-08-20 PROCEDURE — 99024 POSTOP FOLLOW-UP VISIT: CPT | Performed by: SURGERY

## 2019-08-20 PROCEDURE — 80048 BASIC METABOLIC PNL TOTAL CA: CPT | Performed by: PHYSICIAN ASSISTANT

## 2019-08-20 PROCEDURE — 80053 COMPREHEN METABOLIC PANEL: CPT | Performed by: EMERGENCY MEDICINE

## 2019-08-20 RX ORDER — METOCLOPRAMIDE HYDROCHLORIDE 5 MG/ML
10 INJECTION INTRAMUSCULAR; INTRAVENOUS ONCE
Status: COMPLETED | OUTPATIENT
Start: 2019-08-20 | End: 2019-08-20

## 2019-08-20 RX ORDER — HYDROCODONE BITARTRATE AND ACETAMINOPHEN 5; 325 MG/1; MG/1
1 TABLET ORAL EVERY 6 HOURS PRN
Qty: 15 EACH | Refills: 0 | Status: SHIPPED | OUTPATIENT
Start: 2019-08-20 | End: 2019-08-24

## 2019-08-20 RX ORDER — METOCLOPRAMIDE HYDROCHLORIDE 5 MG/ML
10 INJECTION INTRAMUSCULAR; INTRAVENOUS EVERY 6 HOURS PRN
Status: DISCONTINUED | OUTPATIENT
Start: 2019-08-20 | End: 2019-08-21

## 2019-08-20 RX ORDER — MORPHINE SULFATE 4 MG/ML
4 INJECTION, SOLUTION INTRAMUSCULAR; INTRAVENOUS ONCE
Status: COMPLETED | OUTPATIENT
Start: 2019-08-20 | End: 2019-08-20

## 2019-08-20 RX ORDER — BISACODYL 10 MG
10 SUPPOSITORY, RECTAL RECTAL ONCE
Status: COMPLETED | OUTPATIENT
Start: 2019-08-20 | End: 2019-08-20

## 2019-08-20 RX ORDER — ACETAMINOPHEN 325 MG/1
975 TABLET ORAL EVERY 8 HOURS SCHEDULED
Qty: 30 TABLET | Refills: 0 | Status: SHIPPED | OUTPATIENT
Start: 2019-08-20 | End: 2019-08-23

## 2019-08-20 RX ORDER — HYDROCODONE BITARTRATE AND ACETAMINOPHEN 5; 325 MG/1; MG/1
1 TABLET ORAL EVERY 6 HOURS PRN
Qty: 15 TABLET | Refills: 0 | Status: SHIPPED | OUTPATIENT
Start: 2019-08-20 | End: 2019-08-21 | Stop reason: HOSPADM

## 2019-08-20 RX ORDER — PROMETHAZINE HYDROCHLORIDE 25 MG/ML
25 INJECTION, SOLUTION INTRAMUSCULAR; INTRAVENOUS EVERY 6 HOURS PRN
Status: DISCONTINUED | OUTPATIENT
Start: 2019-08-20 | End: 2019-08-21 | Stop reason: HOSPADM

## 2019-08-20 RX ORDER — ONDANSETRON 2 MG/ML
4 INJECTION INTRAMUSCULAR; INTRAVENOUS ONCE
Status: COMPLETED | OUTPATIENT
Start: 2019-08-20 | End: 2019-08-20

## 2019-08-20 RX ADMIN — ENOXAPARIN SODIUM 40 MG: 40 INJECTION SUBCUTANEOUS at 08:23

## 2019-08-20 RX ADMIN — SODIUM CHLORIDE, SODIUM LACTATE, POTASSIUM CHLORIDE, AND CALCIUM CHLORIDE 75 ML/HR: .6; .31; .03; .02 INJECTION, SOLUTION INTRAVENOUS at 02:23

## 2019-08-20 RX ADMIN — KETOROLAC TROMETHAMINE 15 MG: 30 INJECTION, SOLUTION INTRAMUSCULAR at 05:12

## 2019-08-20 RX ADMIN — SODIUM CHLORIDE 1000 ML: 0.9 INJECTION, SOLUTION INTRAVENOUS at 22:59

## 2019-08-20 RX ADMIN — FAMOTIDINE 20 MG: 10 INJECTION, SOLUTION INTRAVENOUS at 08:12

## 2019-08-20 RX ADMIN — MORPHINE SULFATE 4 MG: 4 INJECTION INTRAVENOUS at 23:00

## 2019-08-20 RX ADMIN — BISACODYL 10 MG: 10 SUPPOSITORY RECTAL at 08:12

## 2019-08-20 RX ADMIN — METOCLOPRAMIDE 10 MG: 5 INJECTION, SOLUTION INTRAMUSCULAR; INTRAVENOUS at 23:40

## 2019-08-20 RX ADMIN — ACETAMINOPHEN 975 MG: 325 TABLET, FILM COATED ORAL at 05:12

## 2019-08-20 RX ADMIN — ONDANSETRON 4 MG: 2 INJECTION INTRAMUSCULAR; INTRAVENOUS at 22:59

## 2019-08-20 NOTE — PROGRESS NOTES
Progress Note - Bariatric Surgery   Sujatha Gonzalez 48 y o  female MRN: [id number]  Unit/Bed#: 2 [place] [address] Encounter: [id number]    Assessment:  POD 1 s/p LSG and HHR  Doing well overall, incisions c/d/i, pain controlled, no PONV, tolerating adequate PO fluid intake, ambulating hallways    Plan:    1  Cont PO sips of clears  2  Cont OOB in halls  3  Cont ATC analgesia & PRNs, antiemetics prn  4  Restart home meds  5  VTE ppx: Enoxaparin (Lovenox) and Sequential pneumatic compression stocking  6  Cont pulm toilet  7  DC home today    Subjective/Objective   Chief Complaint: none    Subjective: No incisional pain; ambulating halls; tolerating PO clears    Objective:     Vitals: Blood pressure 112/62, pulse 61, temperature 97 7 °F (36 5 °C), resp  rate 22, height 5' 7" (1 702 m), weight 112 kg (247 lb), SpO2 98 %  ,Body mass index is 38 69 kg/m²  Intake/Output Summary (Last 24 hours) at 8/20/2019 8321  Last data filed at 8/20/2019 0501  Gross per 24 hour   Intake 3348 ml   Output --   Net 3348 ml       Invasive Devices     Peripheral Intravenous Line            Peripheral IV 08/19/19 Right Wrist 1 day                Physical Exam: NAD, EOMI, abdominal incisions c/d/i, dressing removed, appropriate tenderness at incisions, -r/g, SCDs on and working      Lab, Imaging and other studies:   I have personally reviewed pertinent labs    CBC:   Lab Results   Component Value Date    WBC 9 29 08/20/2019    HGB 12 9 08/20/2019    HCT 40 2 08/20/2019     (H) 08/20/2019     08/20/2019    MCH 32 2 08/20/2019    MCHC 32 1 08/20/2019    RDW 12 9 08/20/2019    MPV 10 9 08/20/2019     CMP:   Lab Results   Component Value Date    SODIUM 137 08/20/2019     08/20/2019    CO2 24 08/20/2019    BUN 15 08/20/2019    CREATININE 0 94 08/20/2019    CALCIUM 8 6 08/20/2019    EGFR 69 08/20/2019     VTE Pharmacologic Prophylaxis: Sequential compression device (Venodyne)  and Enoxaparin (Lovenox)  VTE Mechanical Prophylaxis: sequential compression device

## 2019-08-20 NOTE — UTILIZATION REVIEW
Initial Clinical Review    Elective INPATIENT surgical procedure   AUTH# [id number]  Age/Sex: 48 y o  female  Surgery Date: 8/19/19  Procedure: LAPAROSCOPIC SLEEVE GASTRECTOMY   REPAIR HERNIA PARAESOPHAGEAL  LAPAROSCOPIC  Anesthesia: GENERAL  Operative Findings: Paraesophageal hernia  Admission Orders: Date/Time/Statement: 8/19/19 @ 1329   Orders Placed This Encounter   Procedures    Inpatient Admission     Standing Status:   Standing     Number of Occurrences:   1     Order Specific Question:   Admitting Physician     Answer:   Meagan Quintero     Order Specific Question:   Level of Care     Answer:   Med Surg [16]     Order Specific Question:   Bed Type     Answer:   Bariatric [1]     Order Specific Question:   Estimated length of stay     Answer:   Inpatient Only Surgery     Vital Signs: /62   Pulse 61   Temp 97 7 °F (36 5 °C)   Resp 22   Ht 5' 7" (1 702 m)   Wt 112 kg (247 lb)   SpO2 98%   BMI 38 69 kg/m²   ADMISSION ORDERS:  TELEMETRY  URINARY RETENTION PROCOTOL  Diet: BARIATRIC CLEAR LIQUIDS  DVT Prophylaxis: VENODYNES  Medications/Pain Control:   Current Facility-Administered Medications:  acetaminophen 975 mg Oral Q6H [facility]   enoxaparin 40 mg Subcutaneous Q24H [facility]   escitalopram 10 mg Oral Daily   famotidine 20 mg Intravenous BID   HYDROmorphone 1 mg Oral Q4H PRN   HYDROmorphone 2 mg Oral Q4H PRN   ketorolac 15 mg Intravenous Q6H [facility]   Labetalol HCl 10 mg Intravenous Q6H PRN   Labetalol HCl 5 mg Intravenous Q4H PRN   lactated ringers 75 mL/hr Intravenous Continuous   morphine injection 4 mg Intravenous Q2H PRN   morphine injection 2 mg Intravenous Q2H PRN   ondansetron 4 mg Intravenous Q6H PRN   phenol 2 spray Mouth/Throat Q2H PRN   scopolamine 1 patch Transdermal Once   simethicone 80 mg Oral 4x Daily PRN   valACYclovir 2,000 mg Oral Q12H [facility]     Network Utilization Review Department  Phone: [phone]; Fax [phone]  [email or web address]  ATTENTION: Please call with any questions or concerns to [phone] and carefully listen to the prompts so that you are directed to the right person  Send all requests for admission clinical reviews, approved or denied determinations and any other requests to fax [phone]   All voicemails are confidential

## 2019-08-20 NOTE — DISCHARGE INSTRUCTIONS
Bariatric/Weight Loss Surgery  Hospital Discharge Instructions  1  ACTIVITY:  a  Progress as feels comfortable - a good rule is:  if you are doing something and it begins to hurt, stop doing the activity  Walk every hour while awake   b  You may walk stairs if you do so slowly  c  You may shower 48 hours after surgery  d  Use your incentive spirometer 10 times per hour while awake for 1 weeks  e  DO NOT drive for 48 hours after surgery  After those 48 hours, if you are still taking prescription pain medicine you must not drive until you have stopped taking them for 24 hours  Examples of such medication are Percocet, Darvocet, Oxycodone, Tylenol #3, and Tylenol with Codeine  Follow your pharmacists orders  AND no driving until cleared in office by your surgeon      2  DIET  a  Stay on a liquid diet for 7 days after your surgery date, sipping slowly  Refer to your manual for examples of choices  Remember to keep your liquids sugar free or low calorie  You may have protein drinks  Make sure to drink 48-64oz  Per day  b  Cami Torres may advance to the stage 3 (puree diet) one week after your surgery as indicated on your diet progression pamphlet given to you during this hospital stay  3  MEDICATIONS:  a  The abdominal nerve block will wear off during the first 1-2 days that you are home, and you may become sore  Continue to take your Tylenol and your pain medicine as prescribed on your hospital discharge medicine list  Cami Torres may use liquid Tylenol or break/crush the tablets down, as you did in the hospital   b  Start vitamins and minerals when you get home  c  Anti-acid Medicine as per prescription  d  Other medicines as indicated on the Physician Patient Discharge Instructions form given to you at the time of discharge  e  Make sure that you are splitting your pill or tablet medicines in halves or fourths or even crushing them before you take them  Capsules should be opened and mixed with water or jello   You need to do this for at least 4 after surgery  Eventually you will be able to take your medicines the regular way as they were prescribed  You will need to consult with your Family Doctor in regards to all your prescribed medication, particularly those for blood pressure and diabetes  As you lose weight, medical conditions may change, requiring an alteration or elimination of the drug dose  f  DO NOT TAKE BIRTH CONTROL(BC) MEDICINES, INSERT BC VAGINAL RINGS, OR PLACE IUD OR ANY OTHER BC METHODS UNTIL 31 DAYS FROM DAY OF DISCHARGE FROM HOSPITAL  THIS PLACES YOU AT HIGH RISK FOR A POTENTIALLY LIFE THREATENING BLOOD CLOT  Remember to always use barrier methods for birth control and speak to your GYN about using two forms of birth control to start 31 days after surgery  It is very important to avoid pregnancy until at least 18-24 months after surgery  4  INCISION CARE  a  You may shower and get incisions wet 2 days after surgery  No soaking tub baths or swimming for 30 days after surgery  Keep abdominal area and incisions clean  Use soap and water to create a good lather and rinse off  Do not scrub incisions  b  If you have a drain, empty the drain as the nurses instructed  5  FOLLOW-UP APPOINTMENT should be made for one week after discharge  Call surgeons office at [phone] to schedule an appointment      6  CALL YOUR DOCTOR FOR:  pain not controlled by pain medications, a temperature greater than 101 5° F, any increase or change in drainage or redness from any incision, any vomiting or inability to keep liquids down, shortness of breath, shoulder pain, or bleeding

## 2019-08-20 NOTE — PLAN OF CARE
Problem: RESPIRATORY - ADULT  Goal: Achieves optimal ventilation and oxygenation  Description  INTERVENTIONS:  - Assess for changes in respiratory status  - Assess for changes in mentation and behavior  - Position to facilitate oxygenation and minimize respiratory effort  - Oxygen administered by appropriate delivery if ordered  - Initiate smoking cessation education as indicated  - Encourage broncho-pulmonary hygiene including cough, deep breathe, Incentive Spirometry  - Assess the need for suctioning and aspirate as needed  - Assess and instruct to report SOB or any respiratory difficulty  - Respiratory Therapy support as indicated  - will use her own CPAP Hs for [facility]   8/20/2019 1214 by Juliana Castillo RN  Outcome: Adequate for Discharge  8/20/2019 1000 by Juliana Castillo RN  Outcome: Progressing     Problem: Potential for Falls  Goal: Patient will remain free of falls  Description  INTERVENTIONS:  - Assess patient frequently for physical needs  -  Identify cognitive and physical deficits and behaviors that affect risk of falls    -  [place] fall precautions as indicated by assessment   - Educate patient/family on patient safety including physical limitations  - Instruct patient to call for assistance with activity based on assessment  - Modify environment to reduce risk of injury  - Consider OT/PT consult to assist with strengthening/mobility  8/20/2019 1214 by Juliana Castillo RN  Outcome: Adequate for Discharge  8/20/2019 1000 by Juliana Castillo RN  Outcome: Progressing     Problem: CARDIOVASCULAR - ADULT  Goal: Absence of cardiac dysrhythmias or at baseline rhythm  Description  INTERVENTIONS:  - Continuous cardiac monitoring, vital signs, obtain 12 lead EKG if ordered  - Administer antiarrhythmic and heart rate control medications as ordered  - Monitor electrolytes and administer replacement therapy as ordered  8/20/2019 1214 by Juliana Castillo RN  Outcome: Adequate for Discharge  8/20/2019 1000 by Reyes Montague RN  Outcome: Progressing     Problem: GASTROINTESTINAL - ADULT  Goal: Minimal or absence of nausea and/or vomiting  Description  INTERVENTIONS:  - Administer IV fluids if ordered to ensure adequate hydration  - Maintain NPO status until nausea and vomiting are resolved  - Nasogastric tube if ordered  - Administer ordered antiemetic medications as needed  - Provide nonpharmacologic comfort measures as appropriate  - Advance diet as tolerated, if ordered  - Consider nutrition services referral to assist patient with adequate nutrition and appropriate food choices  8/20/2019 1214 by Reyes Montague RN  Outcome: Adequate for Discharge  8/20/2019 1000 by Reyes Montague RN  Outcome: Progressing  Goal: Maintains or returns to baseline bowel function  Description  INTERVENTIONS:  - Assess bowel function  - Encourage oral fluids to ensure adequate hydration  - Administer IV fluids if ordered to ensure adequate hydration  - Administer ordered medications as needed  - Encourage mobilization and activity  - Consider nutritional services referral to assist patient with adequate nutrition and appropriate food choices  8/20/2019 1214 by Reyes Montague RN  Outcome: Adequate for Discharge  8/20/2019 1000 by Reyes Montague RN  Outcome: Progressing     Problem: SKIN/TISSUE INTEGRITY - ADULT  Goal: Incision(s), wounds(s) or drain site(s) healing without S/S of infection  Description  INTERVENTIONS  - Assess and document risk factors for skin impairment   - Assess and document dressing, incision, wound bed, drain sites and surrounding tissue  - Consider nutrition services referral as needed  - Oral mucous membranes remain intact  - Provide patient/ family education  8/20/2019 1214 by Reyes Montague RN  Outcome: Adequate for Discharge  8/20/2019 1000 by Reyes Montague RN  Outcome: Progressing

## 2019-08-20 NOTE — DISCHARGE SUMMARY
Discharge Summary - Deborra Show 48 y o  female MRN: [id number]    Unit/Bed#: [address] [place] [address] Encounter: [id number]      Pre-Operative Diagnosis: Pre-Op Diagnosis Codes:     * Morbid obesity ([address] ) [E66 01]     * Sleep apnea [G47 30]     * Gastroesophageal reflux disease [K21 9]    Post-Operative Diagnosis: Post-Op Diagnosis Codes:     * Morbid obesity ([address] ) [E66 01]     * Sleep apnea [G47 30]     * Gastroesophageal reflux disease [K21 9]    Procedures Performed:  Procedure(s):  LAPAROSCOPIC SLEEVE GASTRECTOMY  REPAIR HERNIA PARAESOPHAGEAL  LAPAROSCOPIC    Surgeon: Felipe Sharp MD    See H & P for full details of admission and Operative Note for full details of operations performed  Hospital Course:  Patient was admitted for a Laparoscopic Sleeve Gastrectomy and during the procedure was found to require a paraesophageal hernia repair  The patient tolerated the procedure very well  Post operatively pain was controlled with oral analgesics and the patient is ambulating/micturating without difficulty  Vital signs and lab work were stable  The patient is tolerating clear liquid diet without nausea or vomiting  The patient is cleared for D/C by the surgeon on POD1  Patient was seen and examined prior to discharge  Provisions for Follow-Up Care:  See After Visit Summary for information related to follow-up care and home orders  Disposition: Home, in stable condition  Patient should refer to "Discharge Instructions" for further information  Planned Readmission: No    Discharge Medications:  See After Visit Summary for reconciled discharge medications provided to patient and family  Post Operative instructions: Reviewed with patient and/or family  This text is generated with voice recognition software  There may be translation, syntax,  or grammatical errors  If you have any questions, please contact the dictating provider       Signature:   Alanis Hurley PA-C  Date: 8/20/2019 Time: 9:28 AM

## 2019-08-20 NOTE — TELEPHONE ENCOUNTER
Spoke with [facility]  He is aware to cancel the oxycodone already sent in for her and that a new script for Vicodin had been sent electronically to them by  STACEY Cage is aware of this as well

## 2019-08-20 NOTE — NURSING NOTE
Pt d/c from [address] [place] [address] to home  D/c medications and instructions reviewed with the pt, all questions answered  Pt verbalized understanding  IV access removed prior to d/c  Pt left the unit walking unassisted accompanied by her sister  Pt left the unit with all belongings in her possession

## 2019-08-20 NOTE — PLAN OF CARE
Problem: RESPIRATORY - ADULT  Goal: Achieves optimal ventilation and oxygenation  Description  INTERVENTIONS:  - Assess for changes in respiratory status  - Assess for changes in mentation and behavior  - Position to facilitate oxygenation and minimize respiratory effort  - Oxygen administered by appropriate delivery if ordered  - Initiate smoking cessation education as indicated  - Encourage broncho-pulmonary hygiene including cough, deep breathe, Incentive Spirometry  - Assess the need for suctioning and aspirate as needed  - Assess and instruct to report SOB or any respiratory difficulty  - Respiratory Therapy support as indicated  - will use her own CPAP Hs for [facility]   Outcome: Progressing     Problem: Potential for Falls  Goal: Patient will remain free of falls  Description  INTERVENTIONS:  - Assess patient frequently for physical needs  -  Identify cognitive and physical deficits and behaviors that affect risk of falls    -  [place] fall precautions as indicated by assessment   - Educate patient/family on patient safety including physical limitations  - Instruct patient to call for assistance with activity based on assessment  - Modify environment to reduce risk of injury  - Consider OT/PT consult to assist with strengthening/mobility  Outcome: Progressing     Problem: CARDIOVASCULAR - ADULT  Goal: Absence of cardiac dysrhythmias or at baseline rhythm  Description  INTERVENTIONS:  - Continuous cardiac monitoring, vital signs, obtain 12 lead EKG if ordered  - Administer antiarrhythmic and heart rate control medications as ordered  - Monitor electrolytes and administer replacement therapy as ordered  Outcome: Progressing     Problem: GASTROINTESTINAL - ADULT  Goal: Minimal or absence of nausea and/or vomiting  Description  INTERVENTIONS:  - Administer IV fluids if ordered to ensure adequate hydration  - Maintain NPO status until nausea and vomiting are resolved  - Nasogastric tube if ordered  - Administer ordered antiemetic medications as needed  - Provide nonpharmacologic comfort measures as appropriate  - Advance diet as tolerated, if ordered  - Consider nutrition services referral to assist patient with adequate nutrition and appropriate food choices  Outcome: Progressing  Goal: Maintains or returns to baseline bowel function  Description  INTERVENTIONS:  - Assess bowel function  - Encourage oral fluids to ensure adequate hydration  - Administer IV fluids if ordered to ensure adequate hydration  - Administer ordered medications as needed  - Encourage mobilization and activity  - Consider nutritional services referral to assist patient with adequate nutrition and appropriate food choices  Outcome: Progressing     Problem: SKIN/TISSUE INTEGRITY - ADULT  Goal: Incision(s), wounds(s) or drain site(s) healing without S/S of infection  Description  INTERVENTIONS  - Assess and document risk factors for skin impairment   - Assess and document dressing, incision, wound bed, drain sites and surrounding tissue  - Consider nutrition services referral as needed  - Oral mucous membranes remain intact  - Provide patient/ family education  Outcome: Progressing

## 2019-08-21 VITALS
TEMPERATURE: 99.1 F | OXYGEN SATURATION: 92 % | SYSTOLIC BLOOD PRESSURE: 144 MMHG | WEIGHT: 251.6 LBS | HEIGHT: 67 IN | HEART RATE: 53 BPM | BODY MASS INDEX: 39.49 KG/M2 | DIASTOLIC BLOOD PRESSURE: 81 MMHG | RESPIRATION RATE: 20 BRPM

## 2019-08-21 PROBLEM — R11.0 NAUSEA: Status: ACTIVE | Noted: 2019-08-21

## 2019-08-21 LAB
ANION GAP SERPL CALCULATED.3IONS-SCNC: 9 MMOL/L (ref 4–13)
BUN SERPL-MCNC: 14 MG/DL (ref 5–25)
CALCIUM SERPL-MCNC: 8 MG/DL (ref 8.3–10.1)
CHLORIDE SERPL-SCNC: 106 MMOL/L (ref 100–108)
CO2 SERPL-SCNC: 24 MMOL/L (ref 21–32)
CREAT SERPL-MCNC: 0.85 MG/DL (ref 0.6–1.3)
ERYTHROCYTE [DISTWIDTH] IN BLOOD BY AUTOMATED COUNT: 13.2 % (ref 11.6–15.1)
GFR SERPL CREATININE-BSD FRML MDRD: 78 ML/MIN/1.73SQ M
GLUCOSE P FAST SERPL-MCNC: 95 MG/DL (ref 65–99)
GLUCOSE SERPL-MCNC: 95 MG/DL (ref 65–140)
HCT VFR BLD AUTO: 38.9 % (ref 34.8–46.1)
HGB BLD-MCNC: 12.2 G/DL (ref 11.5–15.4)
MCH RBC QN AUTO: 32.4 PG (ref 26.8–34.3)
MCHC RBC AUTO-ENTMCNC: 31.4 G/DL (ref 31.4–37.4)
MCV RBC AUTO: 104 FL (ref 82–98)
PLATELET # BLD AUTO: 169 THOUSANDS/UL (ref 149–390)
PMV BLD AUTO: 10.7 FL (ref 8.9–12.7)
POTASSIUM SERPL-SCNC: 4.1 MMOL/L (ref 3.5–5.3)
RBC # BLD AUTO: 3.76 MILLION/UL (ref 3.81–5.12)
SODIUM SERPL-SCNC: 139 MMOL/L (ref 136–145)
WBC # BLD AUTO: 8.62 THOUSAND/UL (ref 4.31–10.16)

## 2019-08-21 PROCEDURE — 85027 COMPLETE CBC AUTOMATED: CPT | Performed by: SURGERY

## 2019-08-21 PROCEDURE — 94660 CPAP INITIATION&MGMT: CPT

## 2019-08-21 PROCEDURE — 87081 CULTURE SCREEN ONLY: CPT | Performed by: SURGERY

## 2019-08-21 PROCEDURE — 94760 N-INVAS EAR/PLS OXIMETRY 1: CPT

## 2019-08-21 PROCEDURE — 80048 BASIC METABOLIC PNL TOTAL CA: CPT | Performed by: SURGERY

## 2019-08-21 PROCEDURE — 99024 POSTOP FOLLOW-UP VISIT: CPT | Performed by: SURGERY

## 2019-08-21 RX ORDER — SCOLOPAMINE TRANSDERMAL SYSTEM 1 MG/1
1 PATCH, EXTENDED RELEASE TRANSDERMAL
Status: DISCONTINUED | OUTPATIENT
Start: 2019-08-21 | End: 2019-08-21 | Stop reason: HOSPADM

## 2019-08-21 RX ORDER — BISACODYL 10 MG
10 SUPPOSITORY, RECTAL RECTAL ONCE
Status: COMPLETED | OUTPATIENT
Start: 2019-08-21 | End: 2019-08-21

## 2019-08-21 RX ORDER — MORPHINE SULFATE 4 MG/ML
4 INJECTION, SOLUTION INTRAMUSCULAR; INTRAVENOUS EVERY 2 HOUR PRN
Status: DISCONTINUED | OUTPATIENT
Start: 2019-08-21 | End: 2019-08-21

## 2019-08-21 RX ORDER — DIPHENHYDRAMINE HYDROCHLORIDE 50 MG/ML
25 INJECTION INTRAMUSCULAR; INTRAVENOUS ONCE
Status: COMPLETED | OUTPATIENT
Start: 2019-08-21 | End: 2019-08-21

## 2019-08-21 RX ORDER — ONDANSETRON 2 MG/ML
4 INJECTION INTRAMUSCULAR; INTRAVENOUS EVERY 6 HOURS SCHEDULED
Status: DISCONTINUED | OUTPATIENT
Start: 2019-08-21 | End: 2019-08-21

## 2019-08-21 RX ORDER — ONDANSETRON 4 MG/1
4 TABLET, ORALLY DISINTEGRATING ORAL EVERY 6 HOURS PRN
Qty: 20 TABLET | Refills: 0 | Status: SHIPPED | OUTPATIENT
Start: 2019-08-21

## 2019-08-21 RX ORDER — MAGNESIUM CARB/ALUMINUM HYDROX 105-160MG
296 TABLET,CHEWABLE ORAL ONCE
Status: COMPLETED | OUTPATIENT
Start: 2019-08-21 | End: 2019-08-21

## 2019-08-21 RX ORDER — KETOROLAC TROMETHAMINE 30 MG/ML
15 INJECTION, SOLUTION INTRAMUSCULAR; INTRAVENOUS EVERY 6 HOURS SCHEDULED
Status: DISCONTINUED | OUTPATIENT
Start: 2019-08-21 | End: 2019-08-21 | Stop reason: HOSPADM

## 2019-08-21 RX ORDER — SODIUM PHOSPHATE, DIBASIC AND SODIUM PHOSPHATE, MONOBASIC 7; 19 G/133ML; G/133ML
1 ENEMA RECTAL ONCE
Status: COMPLETED | OUTPATIENT
Start: 2019-08-21 | End: 2019-08-21

## 2019-08-21 RX ORDER — ESCITALOPRAM OXALATE 10 MG/1
10 TABLET ORAL DAILY
Status: DISCONTINUED | OUTPATIENT
Start: 2019-08-21 | End: 2019-08-21 | Stop reason: HOSPADM

## 2019-08-21 RX ORDER — SIMETHICONE 20 MG/.3ML
80 EMULSION ORAL ONCE
Status: COMPLETED | OUTPATIENT
Start: 2019-08-21 | End: 2019-08-21

## 2019-08-21 RX ORDER — SODIUM CHLORIDE, SODIUM LACTATE, POTASSIUM CHLORIDE, CALCIUM CHLORIDE 600; 310; 30; 20 MG/100ML; MG/100ML; MG/100ML; MG/100ML
75 INJECTION, SOLUTION INTRAVENOUS CONTINUOUS
Status: DISCONTINUED | OUTPATIENT
Start: 2019-08-21 | End: 2019-08-21 | Stop reason: HOSPADM

## 2019-08-21 RX ORDER — DIAZEPAM 5 MG/ML
5 INJECTION, SOLUTION INTRAMUSCULAR; INTRAVENOUS ONCE
Status: COMPLETED | OUTPATIENT
Start: 2019-08-21 | End: 2019-08-21

## 2019-08-21 RX ORDER — LORAZEPAM 2 MG/ML
1 INJECTION INTRAMUSCULAR ONCE
Status: DISCONTINUED | OUTPATIENT
Start: 2019-08-21 | End: 2019-08-21

## 2019-08-21 RX ORDER — DIPHENHYDRAMINE HCL 25 MG
25 TABLET ORAL
Status: DISCONTINUED | OUTPATIENT
Start: 2019-08-21 | End: 2019-08-21 | Stop reason: HOSPADM

## 2019-08-21 RX ORDER — ONDANSETRON 2 MG/ML
4 INJECTION INTRAMUSCULAR; INTRAVENOUS EVERY 4 HOURS PRN
Status: DISCONTINUED | OUTPATIENT
Start: 2019-08-21 | End: 2019-08-21 | Stop reason: HOSPADM

## 2019-08-21 RX ADMIN — SODIUM CHLORIDE, SODIUM LACTATE, POTASSIUM CHLORIDE, AND CALCIUM CHLORIDE 1000 ML: .6; .31; .03; .02 INJECTION, SOLUTION INTRAVENOUS at 06:37

## 2019-08-21 RX ADMIN — SODIUM PHOSPHATE 1 ENEMA: 7; 19 ENEMA RECTAL at 07:36

## 2019-08-21 RX ADMIN — ESCITALOPRAM OXALATE 10 MG: 10 TABLET ORAL at 11:39

## 2019-08-21 RX ADMIN — SCOPALAMINE 1 PATCH: 1 PATCH, EXTENDED RELEASE TRANSDERMAL at 01:04

## 2019-08-21 RX ADMIN — ONDANSETRON 4 MG: 2 INJECTION INTRAMUSCULAR; INTRAVENOUS at 05:20

## 2019-08-21 RX ADMIN — KETOROLAC TROMETHAMINE 15 MG: 30 INJECTION INTRAMUSCULAR; INTRAVENOUS at 06:41

## 2019-08-21 RX ADMIN — MAGNESIUM CITRATE 296 ML: 1.75 LIQUID ORAL at 11:39

## 2019-08-21 RX ADMIN — ENOXAPARIN SODIUM 40 MG: 40 INJECTION SUBCUTANEOUS at 09:05

## 2019-08-21 RX ADMIN — DIAZEPAM 5 MG: 5 INJECTION, SOLUTION INTRAMUSCULAR; INTRAVENOUS at 06:44

## 2019-08-21 RX ADMIN — KETOROLAC TROMETHAMINE 15 MG: 30 INJECTION INTRAMUSCULAR; INTRAVENOUS at 11:40

## 2019-08-21 RX ADMIN — SODIUM CHLORIDE, SODIUM LACTATE, POTASSIUM CHLORIDE, AND CALCIUM CHLORIDE 75 ML/HR: .6; .31; .03; .02 INJECTION, SOLUTION INTRAVENOUS at 01:00

## 2019-08-21 RX ADMIN — DIPHENHYDRAMINE HYDROCHLORIDE 25 MG: 50 INJECTION, SOLUTION INTRAMUSCULAR; INTRAVENOUS at 00:05

## 2019-08-21 RX ADMIN — FAMOTIDINE 20 MG: 10 INJECTION, SOLUTION INTRAVENOUS at 09:06

## 2019-08-21 RX ADMIN — SODIUM PHOSPHATE 1 ENEMA: 7; 19 ENEMA RECTAL at 09:15

## 2019-08-21 RX ADMIN — SIMETHICONE 80 MG: 20 SUSPENSION/ DROPS ORAL at 06:50

## 2019-08-21 RX ADMIN — BISACODYL 10 MG: 10 SUPPOSITORY RECTAL at 11:40

## 2019-08-21 NOTE — PROGRESS NOTES
INTERVAL NOTE    Well known patient, s/p sleeve gastrectomy + repair of PEH on POD1  Received call from patient, reporting intractable nausea and dry heaves at home with even minimal PO intake  Patient was d/c'd from the hospital this morning after meeting all discharge milestones  After conversing with the patient and family member, the decision to have the patient come to the ED for symptom mgmt was discussed with the patient and Dr Melchor Conroy  Patients vital signs and ED repeat labwork normal    Clinically stable      Patient's symptoms mildly controlled with IV medication, so patient is being admitted for observation under the Bariatric surgery team  We will reassess in the AM     Case d/w Dr Brinda Tolbert MD  Bariatrics fellow

## 2019-08-21 NOTE — PLAN OF CARE
Problem: PAIN - ADULT  Goal: Verbalizes/displays adequate comfort level or baseline comfort level  Description  Interventions:  - Encourage patient to monitor pain and request assistance  - Assess pain using appropriate pain scale  - Administer analgesics based on type and severity of pain and evaluate response  - Implement non-pharmacological measures as appropriate and evaluate response  - Consider cultural and social influences on pain and pain management  - Notify physician/advanced practitioner if interventions unsuccessful or patient reports new pain  Outcome: Progressing     Problem: GASTROINTESTINAL - ADULT  Goal: Minimal or absence of nausea and/or vomiting  Description  INTERVENTIONS:  - Administer IV fluids if ordered to ensure adequate hydration  - Maintain NPO status until nausea and vomiting are resolved  - Nasogastric tube if ordered  - Administer ordered antiemetic medications as needed  - Provide nonpharmacologic comfort measures as appropriate  - Advance diet as tolerated, if ordered  - Consider nutrition services referral to assist patient with adequate nutrition and appropriate food choices  Outcome: Progressing  Goal: Maintains or returns to baseline bowel function  Description  INTERVENTIONS:  - Assess bowel function  - Encourage oral fluids to ensure adequate hydration  - Administer IV fluids if ordered to ensure adequate hydration  - Administer ordered medications as needed  - Encourage mobilization and activity  - Consider nutritional services referral to assist patient with adequate nutrition and appropriate food choices  Outcome: Progressing  Goal: Maintains adequate nutritional intake  Description  INTERVENTIONS:  - Monitor percentage of each meal consumed  - Identify factors contributing to decreased intake, treat as appropriate  - Assist with meals as needed  - Monitor I&O, weight, and lab values if indicated  - Obtain nutrition services referral as needed  Outcome: Progressing Problem: SKIN/TISSUE INTEGRITY - ADULT  Goal: Incision(s), wounds(s) or drain site(s) healing without S/S of infection  Description  INTERVENTIONS  - Assess and document risk factors for skin impairment   - Assess and document dressing, incision, wound bed, drain sites and surrounding tissue  - Consider nutrition services referral as needed  - Oral mucous membranes remain intact  - Provide patient/ family education  Outcome: Progressing

## 2019-08-21 NOTE — NURSING NOTE
IV removed and all belongings accounted for  After visit summary reviewed and given to patient  Patient left unit on foot, per patient request, at 1700

## 2019-08-21 NOTE — H&P
H&P Exam - General Surgery   Ruthann Jesus 48 y o  female MRN: [id number]  Unit/Bed#: [address] [address] [place] Encounter: [id number]    Assessment/Plan     Assessment:   52yo F POD 2 s/p LSG and HHR  Developed n/v after taking colace gel cap for 3-4 days of preoperative constipation  Stable/improved at this time  Plan:  1  Observation status  2  Bariatric clears  3  ATC analgesia  4  Will administer antispasmodic, simethicone, antiemetic PRN  5  IVF bolus x1  6  Ambulate halls, IS  7  Probably DC this afternoon    History of Present Illness     HPI:  Ruthann Jesus is a 48 y o  female who presents with n/v after taking colace gel cap at home for constipation  Constipation was present 3-4 days prior to her surgery  She is POD 2 s/p LSG and HHR  States that she has had 1-2 episodes of nausea overnight that she felt were "spasms" in her epigastrium  +Flatus/+small BM  Only c/o is epigastric discomfort/pressure    Review of Systems   Constitutional: Negative  Respiratory: Negative  Cardiovascular: Negative  Gastrointestinal: Positive for nausea and vomiting  Musculoskeletal: Negative  Neurological: Negative  All other systems reviewed and are negative        Historical Information   Past Medical History:   Diagnosis Date    Acid reflux     Anxiety     Asthma     CPAP (continuous positive airway pressure) dependence     Depression     Frequent headaches     Heartburn     Hiatal hernia     Increased urinary frequency     Lyme disease     Night sweats     Obesity     Osteoarthritis     Sleep apnea     SOB (shortness of breath)     Swelling of multiple joints      Past Surgical History:   Procedure Laterality Date    AUGMENTATION BREAST      BREAST SURGERY      implants(silicon) repaired x5 last [id number]    COCCYGECTOMY  03/25/2019    COLONOSCOPY      FOOT SURGERY Right     x4    PARAESOPHAGEAL HERNIA REPAIR N/A 8/19/2019    Procedure: REPAIR HERNIA PARAESOPHAGEAL  LAPAROSCOPIC;  Surgeon: Vielka Rodriguez MD;  Location: [facility];  Service: Bariatrics    AK EGD TRANSORAL BIOPSY SINGLE/MULTIPLE N/A 2019    Procedure: ESOPHAGOGASTRODUODENOSCOPY (EGD); Surgeon: Vielka Rodriguez MD;  Location: [facility];  Service: General    AK LAP, CAITLIN RESTRICT PROC, LONGITUDINAL GASTRECTOMY N/A 2019    Procedure: LAPAROSCOPIC SLEEVE GASTRECTOMY;  Surgeon: Vielka Rodriguez MD;  Location: [facility];  Service: Bariatrics     Social History   Social History     Substance and Sexual Activity   Alcohol Use Yes    Comment: social     Social History     Substance and Sexual Activity   Drug Use No     Social History     Tobacco Use   Smoking Status Former Smoker    Last attempt to quit: 1995    Years since quittin 3   Smokeless Tobacco Never Used   Tobacco Comment    Quit 15 years ago     Family History: non-contributory    Meds/Allergies   all medications and allergies reviewed  Allergies   Allergen Reactions    Oxycodone GI Intolerance       Objective   First Vitals:   Blood Pressure: 135/64 (19)  Pulse: 69 (19)  Temperature: 98 3 °F (36 8 °C) (19)  Temp Source: Oral (19)  Respirations: 20 (19)  Height: 5' 7" (170 2 cm) (19)  Weight - Scale: 114 kg (251 lb 9 6 oz) (19)  SpO2: 97 % (19)    Current Vitals:   Blood Pressure: 140/78 (19)  Pulse: 61 (19)  Temperature: 98 7 °F (37 1 °C) (19)  Temp Source: Oral (19 0009)  Respirations: 18 (19)  Height: 5' 7" (170 2 cm) (19)  Weight - Scale: 114 kg (251 lb 9 6 oz) (19)  SpO2: 94 % (19 0400)    No intake or output data in the 24 hours ending 19 0652    Invasive Devices     Peripheral Intravenous Line            Peripheral IV 19 Left Antecubital less than 1 day                Physical Exam   Constitutional: She is oriented to person, place, and time  She appears well-developed  HENT:   Head: Normocephalic  Eyes: EOM are normal    Neck: Normal range of motion  Cardiovascular: Normal rate  Pulmonary/Chest: Effort normal    Abdominal: Soft  She exhibits distension  There is no tenderness  There is no rebound and no guarding  Incisions c/d/i  No signs of infection  Skin glue intact   Musculoskeletal: Normal range of motion  Neurological: She is alert and oriented to person, place, and time  Skin: Skin is warm and dry  Psychiatric: She has a normal mood and affect  Her behavior is normal  Judgment and thought content normal    Nursing note and vitals reviewed  Lab Results:   I have personally reviewed pertinent lab results  , CBC:   Lab Results   Component Value Date    WBC 8 62 08/21/2019    HGB 12 2 08/21/2019    HCT 38 9 08/21/2019     (H) 08/21/2019     08/21/2019    MCH 32 4 08/21/2019    MCHC 31 4 08/21/2019    RDW 13 2 08/21/2019    MPV 10 7 08/21/2019    NRBC 0 08/20/2019   , CMP:   Lab Results   Component Value Date    SODIUM 139 08/21/2019    K 4 1 08/21/2019     08/21/2019    CO2 24 08/21/2019    BUN 14 08/21/2019    CREATININE 0 85 08/21/2019    CALCIUM 8 0 (L) 08/21/2019    AST 23 08/20/2019    ALT 35 08/20/2019    ALKPHOS 57 08/20/2019    EGFR 78 08/21/2019         Code Status: level 1    Counseling / Coordination of Care  Total floor / unit time spent today 30 minutes  Greater than 50% of total time was spent with the patient and / or family counseling and / or coordination of care

## 2019-08-21 NOTE — UTILIZATION REVIEW
Initial Clinical Review    Admission: Date/Time/Statement:    Admission Orders (From admission, onward)     Ordered        08/20/19 2346  Place in Observation (expected length of stay for this patient is less than two midnights)  Once                   Orders Placed This Encounter   Procedures    Place in Observation (expected length of stay for this patient is less than two midnights)     Standing Status:   Standing     Number of Occurrences:   1     Order Specific Question:   Admitting Physician     Answer:   Susanna Young     Order Specific Question:   Level of Care     Answer:   Med Surg [16]     ED Arrival Information     Expected Arrival Acuity Means of Arrival Escorted By Service Admission Type    - 8/20/2019 22:07 Urgent Walk-In Family Member Bariatrics Urgent    Arrival Complaint    vomiting        Chief Complaint   Patient presents with    Vomiting     States post op gastric sleeve and hiatal hernia repair, discharged today at 15 noon   States when takes a sip of broth or any liquid immediately vomits and than has dry heaves     Assessment/Plan:   48 year female that presents with vomiting  Patient is postop day 2 from a gastric sleeve along with hiatal hernia repair  Patient states she was discharged today at noon  States since she got home she has not been able to keep anything down  States she has been having extreme pain she tries take pain medication with keeps vomiting get out  Vomiting up bile  States she is dry heaving  She called the bariatric surgery team who advised she come to the hospital   Patient states she has pain in the epigastric area  Well known patient, s/p sleeve gastrectomy + repair of PEH on POD1    Received call from patient, reporting intractable nausea and dry heaves at home with even minimal PO intake  Patient was d/c'd from the hospital this morning after meeting all discharge milestones    After conversing with the patient and family member, the decision to have the patient come to the ED for symptom mgmt was discussed with the patient and Dr Adriane Morrison  Patients vital signs and ED repeat labwork normal    Clinically stable    Patient's symptoms mildly controlled with IV medication, so patient is being admitted for observation under the Bariatric surgery team  We will reassess in the AM     ED Triage Vitals [08/20/19 2225]   Temperature Pulse Respirations Blood Pressure SpO2   98 3 °F (36 8 °C) 69 20 135/64 97 %      Temp Source Heart Rate Source Patient Position - Orthostatic VS BP Location FiO2 (%)   Oral Monitor Lying Left arm --      Pain Score       8        Wt Readings from Last 1 Encounters:   08/20/19 114 kg (251 lb 9 6 oz)     Additional Vital Signs:   08/20/19 2345  --  58  --  --  --  90 %  --  --   08/20/19 2315  --  60  --  --  --  95 %  --  --   08/20/19 2245  --  60  --  --  --  100 %  --  --   08/20/19 2225  98 3 °F (36 8 °C)  69  20  135/64  --  97 %  None (Room air)  Lying     Pertinent Labs/Diagnostic Test Results:   Results from last 7 days   Lab Units 08/21/19 0621 08/20/19 2258 08/20/19  0500   WBC Thousand/uL 8 62 10 23* 9 29   HEMOGLOBIN g/dL 12 2 12 7 12 9   HEMATOCRIT % 38 9 38 7 40 2   PLATELETS Thousands/uL 169 198 209   NEUTROS ABS Thousands/µL  --  7 80*  --      Results from last 7 days   Lab Units 08/21/19 0621 08/20/19 2258 08/20/19  0500   SODIUM mmol/L 139 139 137   POTASSIUM mmol/L 4 1 3 9 4 3   CHLORIDE mmol/L 106 104 103   CO2 mmol/L 24 25 24   ANION GAP mmol/L 9 10 10   BUN mg/dL 14 15 15   CREATININE mg/dL 0 85 0 96 0 94   EGFR ml/min/1 73sq m 78 68 69   CALCIUM mg/dL 8 0* 8 3 8 6     Results from last 7 days   Lab Units 08/20/19 2258   AST U/L 23   ALT U/L 35   ALK PHOS U/L 57   TOTAL PROTEIN g/dL 7 1   ALBUMIN g/dL 3 6   TOTAL BILIRUBIN mg/dL 0 40     Results from last 7 days   Lab Units 08/21/19 0621 08/20/19 2258 08/20/19  0500   GLUCOSE RANDOM mg/dL 95 104 105     Results from last 7 days   Lab Units 08/20/19  5607 LIPASE u/L 157     ED Treatment:   Medication Administration from 08/20/2019 2207 to 08/21/2019 0843       Date/Time Order Dose Route Action Action by Comments     08/20/2019 9019 sodium chloride 0 9 % bolus 1,000 mL 1,000 mL Intravenous New Bag Karthikeyanisak Anderson, [facility]      08/20/2019 2254 ondansetron [facility]) injection 4 mg 4 mg Intravenous Given Dann Anderson, RN      08/20/2019 2300 morphine (PF) 4 mg/mL injection 4 mg 4 mg Intravenous Given Dann Anderson, RN      08/20/2019 2340 metoclopramide (REGLAN) injection 10 mg 10 mg Intravenous Given Dann Anderson, GEOVANY      08/21/2019 0005 diphenhydrAMINE (BENADRYL) injection 25 mg 25 mg Intravenous Given Dann Anderson, GEOVANY         Past Medical History:   Diagnosis Date    Acid reflux     Anxiety     Asthma     CPAP (continuous positive airway pressure) dependence     Depression     Frequent headaches     Heartburn     Hiatal hernia     Increased urinary frequency     Lyme disease     Night sweats     Obesity     Osteoarthritis     Sleep apnea     SOB (shortness of breath)     Swelling of multiple joints      Admitting Diagnosis: Vomiting [R11 10]  Nausea and vomiting [R11 2]  Age/Sex: 48 y o  female  Admission Orders:  MED SURG  INCENTIVE SPIROMETRY  VENODYNES  BARIATRIC LIQUID DIET  Current Facility-Administered Medications:  diphenhydrAMINE 25 mg Oral HS PRN   enoxaparin 40 mg Subcutaneous Daily   famotidine 20 mg Intravenous BID   ketorolac 15 mg Intravenous Q6H [address]   lactated ringers 1,000 mL Intravenous Once   lactated ringers 75 mL/hr Intravenous Continuous   ondansetron 4 mg Intravenous Q4H PRN   phenol 2 spray Mouth/Throat Q2H PRN   promethazine 25 mg Intramuscular Q6H PRN   scopolamine 1 patch Transdermal Q72H   sodium phosphate-biphosphate 1 enema Rectal Once   sodium phosphate-biphosphate 1 enema Rectal Once     Network Utilization Review Department  Phone: [phone]; Fax [phone]  [email or web address]  ATTENTION: Please call with any questions or concerns to [phone]  and carefully listen to the prompts so that you are directed to the right person  Send all requests for admission clinical reviews, approved or denied determinations and any other requests to fax [phone]   All voicemails are confidential

## 2019-08-22 ENCOUNTER — TELEPHONE (OUTPATIENT)
Dept: BARIATRICS | Facility: CLINIC | Age: 54
End: 2019-08-22

## 2019-08-22 ENCOUNTER — TELEPHONE (OUTPATIENT)
Dept: PERIOP | Facility: HOSPITAL | Age: 54
End: 2019-08-22

## 2019-08-22 LAB — MRSA NOSE QL CULT: NORMAL

## 2019-08-22 NOTE — TELEPHONE ENCOUNTER
Post op follow up phone call completed  Pt is sipping liquids but behind  Reviewed importance of staying hydrated and what to sip on and how much  Using IS as instructed, reinforced importance of using IS to help prevent pneumonia  Ambulating about home without difficulty  Still working on having a b/m  States she did pass some very soft stool in bed last night  Using Miralax and mag citrate  Minimal pain, not using oxycodone  Reaffirmed examples of liquid diet over the next week  Pt stated understanding about discharge instructions and medication adjustments  Tolerating self administration of Lovenox injections without difficulty  Follow up appt with surgeon scheduled for next week     Instructed to call with any additional questions or concerns

## 2019-08-23 ENCOUNTER — TELEPHONE (OUTPATIENT)
Dept: BARIATRICS | Facility: CLINIC | Age: 54
End: 2019-08-23

## 2019-08-23 NOTE — TELEPHONE ENCOUNTER
Pt reports passing very soft stools x4  She is much less nauseous on the Zofran and feeling alittle better as well  Only totaled about 30oz yesterday but doing better with sips today  She will stop the mag citrate and continue miralax as needed till next B/m  To call office with other concerns if needed before follow up with Dr Paxton Reyna

## 2019-08-29 ENCOUNTER — TELEPHONE (OUTPATIENT)
Dept: BARIATRICS | Facility: CLINIC | Age: 54
End: 2019-08-29

## 2019-08-30 ENCOUNTER — OFFICE VISIT (OUTPATIENT)
Dept: BARIATRICS | Facility: CLINIC | Age: 54
End: 2019-08-30

## 2019-08-30 VITALS
HEIGHT: 67 IN | TEMPERATURE: 99.2 F | SYSTOLIC BLOOD PRESSURE: 116 MMHG | RESPIRATION RATE: 16 BRPM | HEART RATE: 88 BPM | BODY MASS INDEX: 36.88 KG/M2 | WEIGHT: 235 LBS | DIASTOLIC BLOOD PRESSURE: 74 MMHG

## 2019-08-30 VITALS — WEIGHT: 235 LBS | HEIGHT: 67 IN | BODY MASS INDEX: 36.88 KG/M2

## 2019-08-30 DIAGNOSIS — Z98.890 HISTORY OF REPAIR OF HIATAL HERNIA: ICD-10-CM

## 2019-08-30 DIAGNOSIS — Z87.19 HISTORY OF REPAIR OF HIATAL HERNIA: ICD-10-CM

## 2019-08-30 DIAGNOSIS — Z98.84 S/P LAPAROSCOPIC SLEEVE GASTRECTOMY: Primary | ICD-10-CM

## 2019-08-30 DIAGNOSIS — K91.2 POSTSURGICAL MALABSORPTION: Primary | ICD-10-CM

## 2019-08-30 PROCEDURE — 99024 POSTOP FOLLOW-UP VISIT: CPT | Performed by: SURGERY

## 2019-08-30 PROCEDURE — RECHECK

## 2019-08-30 NOTE — PROGRESS NOTES
Weight Management Nutrition Class     Diagnosis: Obesity    Bariatric Surgeon: Dr Shelly Nelson     Surgery: Vertical Sleeve Gastrectomy    Class: first post op note    Topics discussed today include:     fluid goals post op, protein goals post op, constipation, chew food well, exercise, avoidance of alcohol, PPI use, diet progression, hypoglycemia, dumping syndrome, protein supplems, vitamin/mineral supplements and calcium supplements    Patient was able to verbalize basic diet (protein, fluid, vitamin and mineral) recommendations and possible nutrition-related complications  Yes     B:  Clear isopure (40gm protein) mixed with water or ice  Tried greek yogurt one day  L:  Not often food  Mostly continues to drink isopure clear  D:  A lot of pureed stews (carrots, celery, sweet potato, seafood stock, tuna steak)    Encouraged pt to have 3 meals per day, 1/4 cup each and drink her isopure between her meals for protein and hydration  Taking bariatric fusion   Having trouble taking all 4, advised can take 2 BID  Has a chewy calcium, was taking 2 per day for an extra 1000mg, advised only needs to only do one (500mg) since she is getting 1000mg calcium from the fusion multi     "full signal" feeling of needing to burp

## 2019-08-30 NOTE — PROGRESS NOTES
FIRST POST-OPERATIVE VISIT - BARIATRIC SURGERY  Addie Jett 48 y o  female MRN: [id number]  Unit/Bed#:  Encounter: [id number]      HPI:  Addie Jtet is a 48 y o  female who presents for follow up s/p laparoscopic sleeve gastrectomy and HHR  Had an episode of nausea, dry heaving for which she presented to the ER after discharge  Has done very well since that time  She is tolerating her diet and advancing physical activity  Denies n/v/cp/sob/d/f/c    Review of Systems   Constitutional: Negative  Respiratory: Negative  Cardiovascular: Negative  Gastrointestinal: Negative  Musculoskeletal: Negative  Neurological: Negative  All other systems reviewed and are negative  Historical Information   Past Medical History:   Diagnosis Date    Acid reflux     Anxiety     Asthma     CPAP (continuous positive airway pressure) dependence     Depression     Frequent headaches     Heartburn     Hiatal hernia     Increased urinary frequency     Lyme disease     Night sweats     Obesity     Osteoarthritis     Sleep apnea     SOB (shortness of breath)     Swelling of multiple joints      Past Surgical History:   Procedure Laterality Date    AUGMENTATION BREAST      BREAST SURGERY      implants(silicon) repaired x5 last [id number]    COCCYGECTOMY  03/25/2019    COLONOSCOPY      FOOT SURGERY Right     x4    PARAESOPHAGEAL HERNIA REPAIR N/A 8/19/2019    Procedure: REPAIR HERNIA PARAESOPHAGEAL  LAPAROSCOPIC;  Surgeon: Akash Ledbetter MD;  Location: [facility];  Service: Bariatrics    IN EGD TRANSORAL BIOPSY SINGLE/MULTIPLE N/A 4/26/2019    Procedure: ESOPHAGOGASTRODUODENOSCOPY (EGD);   Surgeon: Akash Ledbetter MD;  Location: [facility];  Service: General    IN LAP, CAITLIN RESTRICT PROC, LONGITUDINAL GASTRECTOMY N/A 8/19/2019    Procedure: LAPAROSCOPIC SLEEVE GASTRECTOMY;  Surgeon: Akash Ledbetter MD;  Location: [facility];  Service: Bariatrics     Social History   Social History Substance and Sexual Activity   Alcohol Use Not Currently     Social History     Substance and Sexual Activity   Drug Use No     Social History     Tobacco Use   Smoking Status Former Smoker    Last attempt to quit: 1995    Years since quittin 3   Smokeless Tobacco Never Used   Tobacco Comment    Quit 15 years ago     Family History: non-contributory    Meds/Allergies   all medications and allergies reviewed  Allergies   Allergen Reactions    Oxycodone GI Intolerance       Objective     Current Vitals:   /74 (BP Location: Right arm, Patient Position: Sitting, Cuff Size: Large)   Pulse 88   Temp 99 2 °F (37 3 °C) (Tympanic)   Resp 16   Ht 5' 7 2" (1 707 m)   Wt 107 kg (235 lb)   LMP 2018   BMI 36 59 kg/m²       Physical Exam   Constitutional: She is oriented to person, place, and time  She appears well-developed  HENT:   Head: Normocephalic  Eyes: EOM are normal    Neck: Normal range of motion  Cardiovascular: Normal rate  Pulmonary/Chest: Effort normal    Abdominal: Soft  She exhibits no distension  There is no tenderness  There is no rebound  Incisions c/d/i  No signs of infection   Musculoskeletal: Normal range of motion  Neurological: She is alert and oriented to person, place, and time  Skin: Skin is warm and dry  Psychiatric: She has a normal mood and affect  Her behavior is normal  Judgment and thought content normal        Assessment/Plan:    Patient is presenting for the first postoperative visit, patient hospital stay was uneventful without any complications, patient is doing well, has no complaints, is taking vitamins as instructed, currently tolerating the blenderized diet, will advance to soft diet  The patient is also tolerating Lovenox injections and will be completing the remaining doses      Patient will also be meeting with our dietician today to review her vitamin and mineral supplements and also go over her diet and emphasize postoperative commitment and compliance  The patient was also instructed to start exercising on a regular basis  However, I recommended no heavy lifting, or weight exercises for another 2 weeks  F/U in 4 weeks  Patient was instructed to call if develops nausea, vomiting, fever or chills   Pathology was also reviewed and was normal

## 2019-09-04 ENCOUNTER — TELEPHONE (OUTPATIENT)
Dept: BARIATRICS | Facility: CLINIC | Age: 54
End: 2019-09-04

## 2019-09-04 NOTE — TELEPHONE ENCOUNTER
Patient called to reschedule her Global Class scheduled for 9/23 because she had a scheduling conflict  She rescheduled for 9/30 at 2:30pm instead

## 2019-09-30 ENCOUNTER — CLINICAL SUPPORT (OUTPATIENT)
Dept: BARIATRICS | Facility: CLINIC | Age: 54
End: 2019-09-30

## 2019-09-30 VITALS — WEIGHT: 219.8 LBS | BODY MASS INDEX: 34.5 KG/M2 | HEIGHT: 67 IN

## 2019-09-30 DIAGNOSIS — K91.2 POSTSURGICAL MALABSORPTION: Primary | ICD-10-CM

## 2019-09-30 PROCEDURE — RECHECK

## 2019-09-30 NOTE — PROGRESS NOTES
Weight Management Nutrition Class     Diagnosis: Morbid Obesity    Bariatric Surgeon: Dr Desiree Hutchison    Surgery: Vertical Sleeve Gastrectomy    Class: 5 week post op     Topics discussed today include:     fluid goals post op, protein goals post op, constipation, chew food well, exercise, avoidance of alcohol, PPI use, diet progression, hypoglycemia, dumping syndrome, protein supplems, vitamin/mineral supplements and calcium supplements    Patient was able to verbalize basic diet (protein, fluid, vitamin and mineral) recommendations and possible nutrition-related complications  Yes     Pt meeting protein and fluid needs  Consuming 1Isopure clear (40gm protien) mixed with water and ice, 1 premier protein drink &16oz water  She has tried and tolerated eggs, meatball, meatloaf, chicken, tuna, greek yogurt pureed soups  Is taking Bariatric fusion 4 chewables per day and 1 chewable calcium   Advised can swallow the omeprazole whole at this time  Advised can move onto the regular diet around oct 14th      3 month post-op appt November 20th

## 2019-10-04 ENCOUNTER — TELEPHONE (OUTPATIENT)
Dept: BARIATRICS | Facility: CLINIC | Age: 54
End: 2019-10-04

## 2019-11-04 ENCOUNTER — TELEPHONE (OUTPATIENT)
Dept: BARIATRICS | Facility: CLINIC | Age: 54
End: 2019-11-04

## 2020-01-07 PROBLEM — K91.2 POSTSURGICAL MALABSORPTION: Status: ACTIVE | Noted: 2020-01-07

## 2020-01-07 PROBLEM — Z48.815 ENCOUNTER FOR SURGICAL AFTERCARE FOLLOWING SURGERY OF DIGESTIVE SYSTEM: Status: ACTIVE | Noted: 2019-08-30

## 2020-01-07 NOTE — ASSESSMENT & PLAN NOTE
-May slowly taper off PPI  -Avoid food triggers, overeating, laying down after eating, drinking with meals, iced cold fluids

## 2020-01-07 NOTE — PROGRESS NOTES
Assessment/Plan:    Encounter for surgical aftercare following surgery of digestive system  -s/p Vertical Sleeve Gastrectomy and paraesophageal hernia repair with Dr Dawna Galdamez on 08/19/19  EWL is 74%, which places the patient ahead of schedule for expected post surgical weight loss at this time  Initial: 277 2lbs  Current: 188lbs  EWL: 74%  Parker: current  Current BMI is Body mass index is 29 27 kg/m²  · Tolerating a regular diet-yes  · Eating at least 60 grams of protein per day-no; discussed ways to increase and she agrees to f/u with RD  · Following 30/60 minute rule with liquids-no; discussed importance  · Drinking at least 64 ounces of fluid per day-no, advised her to increase  · Drinking carbonated beverages-no  · Sufficient exercise-no d/t foot surgery - she has been cleared and has gym membership and plans to start cardio and lifting again  · Using NSAIDs regularly-no  · Using nicotine-no  · Using alcohol-yes now that dating again  Advised about the risks of alcohol s/p bariatric surgery and recommend avoiding all alcohol      Postsurgical malabsorption  -At risk for malabsorption of vitamins/minerals secondary to malabsorption and restriction of intake from bariatric surgery  -Currently taking adequate postop bariatric surgery vitamin supplementation: Fusion 1/day capsule, calcium citrate 500mg BID  -First set of bariatric labs ordered for approximately now  -Patient received education about the importance of adhering to a lifelong supplementation regimen to avoid vitamin/mineral deficiencies       Sleep apnea  -No longer wearing CPAP d/t poor fit  -Encouraged consistent use of CPAP and follow up with sleep medicine for mask refitting/adjustments   -Discussed risks of untreated sleep apnea such as sudden cardiac death by arrhythmia, uncontrolled hypertension, difficulty with weight loss, decreased quality sleep, increased insulin resistance, and stroke      GERD (gastroesophageal reflux disease)  -May slowly taper off PPI  -Avoid food triggers, overeating, laying down after eating, drinking with meals, iced cold fluids       Diagnoses and all orders for this visit:    Encounter for surgical aftercare following surgery of digestive system  -     CBC and differential; Future  -     Comprehensive metabolic panel; Future  -     Copper Level; Future  -     Ferritin; Future  -     Folate; Future  -     Iron Saturation %; Future  -     PTH, intact; Future  -     Vitamin A; Future  -     Vitamin B1, whole blood; Future  -     Vitamin B12; Future  -     Vitamin D 25 hydroxy; Future  -     Zinc; Future    Postsurgical malabsorption  -     CBC and differential; Future  -     Comprehensive metabolic panel; Future  -     Copper Level; Future  -     Ferritin; Future  -     Folate; Future  -     Iron Saturation %; Future  -     PTH, intact; Future  -     Vitamin A; Future  -     Vitamin B1, whole blood; Future  -     Vitamin B12; Future  -     Vitamin D 25 hydroxy; Future  -     Zinc; Future    Other sleep apnea    Gastroesophageal reflux disease, esophagitis presence not specified    Other orders  -     calcium citrate (CALCITRATE) 950 MG tablet; Take 950 mg by mouth daily          Subjective:      Patient ID: Kareen Gotti is a 47 y o  female  -s/p Vertical Sleeve Gastrectomy and paraesophageal hernia repair with Dr Mago Taylor on 08/19/19  Presents to the office today for routine follow up  Tolerating diet without issues; denies N/V, dysphagia, reflux  Overall doing Well, but not eating enough protein or drinking enough fluids  No exercise d/t foot surgery  Feeling great and excited about weight loss      Diet Recall:   B - equate protein shake, calcium citrate chew  L - tuna or chicken or [place], 1 egg or 1 cheese stick  D - tuna fish and may and pickles, or leftovers 3 chicken nuggets and take bred off  HS - 2 cheese sticks    Drinks with meals if eating out   Fluids - 48oz    The following portions of the patient's history were reviewed and updated as appropriate: allergies, current medications, past family history, past medical history, past social history, past surgical history and problem list     Review of Systems   Constitutional: Negative for chills and fever  Unexpected weight change: planned weight loss  HENT: Negative for trouble swallowing  Respiratory: Negative for cough and shortness of breath  Cardiovascular: Negative for chest pain and palpitations  Gastrointestinal: Negative for abdominal pain, constipation, diarrhea, nausea and vomiting  Musculoskeletal: Positive for arthralgias and gait problem (s/p R foot surgery - still limping)  Neurological: Negative for dizziness  Psychiatric/Behavioral:        High stress, feeling irritable at times         Objective:      BP 98/56 (BP Location: Right arm, Patient Position: Sitting, Cuff Size: Large)   Pulse 79   Temp 98 5 °F (36 9 °C) (Tympanic)   Resp 17   Ht 5' 7 2" (1 707 m)   Wt 85 3 kg (188 lb)   LMP 08/22/2018   BMI 29 27 kg/m²     Colonoscopy-Completed at age 48 - advised her to call GI and inquire about f/u        Physical Exam   Constitutional: She is oriented to person, place, and time  She appears well-developed and well-nourished  No distress  HENT:   Head: Normocephalic and atraumatic  Eyes: Pupils are equal, round, and reactive to light  No scleral icterus  Cardiovascular: Normal rate, regular rhythm and normal heart sounds  Pulmonary/Chest: Effort normal and breath sounds normal  No respiratory distress  Abdominal: Soft  Bowel sounds are normal  She exhibits no distension  There is no tenderness  No incisional hernias appreciated   Musculoskeletal: She exhibits no edema  Neurological: She is alert and oriented to person, place, and time  Skin: Skin is warm and dry  Psychiatric: She has a normal mood and affect  Nursing note and vitals reviewed          BARRIERS: none identified    GOALS: · Continued/Maintain healthy weight loss with good nutrition intakes  · Adequate hydration with at least 64oz  fluid intake  · Normal vitamin and mineral levels  · Exercise as tolerated  · Follow up with Luda Tobar and Patricia the [facility] for additional support  · Follow up with sleep medicine    · Follow-up in 3 months  We kindly ask that your arrive 15 minutes before your scheduled appointment time with your provider to allow our staff to room you, get your vital signs and update your chart  · Follow diet as discussed  · Get lab work done in the next 2 weeks  You have been given a lab slip today  Please call the office if you need a replacement  It is recommended to check with your insurance BEFORE getting labs done to make sure they are covered by your policy  Also, please check with your PCP and other providers before getting labs to avoid duplicate labs  Make sure to HOLD any multivitamins that may contain biotin and any biotin supplements FOR 5 DAYS before any labs since it can affect the results  · Follow vitamin and mineral recommendations as reviewed with you  · Call our office if you have any problems with abdominal pain especially associated with fever, chills, nausea, vomiting or any other concerns  · All  Post-bariatric surgery patients should be aware that very small quantities of any alcohol can cause impairment and it is very possible not to feel the effect  The effect can be in the system for several hours  It is also a stomach irritant  · It is advised to AVOID alcohol, Nonsteroidal antiinflammatory drugs (NSAIDS) and nicotine of all forms   Any of these can cause stomach irritation/pain

## 2020-01-07 NOTE — ASSESSMENT & PLAN NOTE
-No longer wearing CPAP d/t poor fit  -Encouraged consistent use of CPAP and follow up with sleep medicine for mask refitting/adjustments   -Discussed risks of untreated sleep apnea such as sudden cardiac death by arrhythmia, uncontrolled hypertension, difficulty with weight loss, decreased quality sleep, increased insulin resistance, and stroke

## 2020-01-07 NOTE — ASSESSMENT & PLAN NOTE
-s/p Vertical Sleeve Gastrectomy and paraesophageal hernia repair with Dr Dougie Hilario on 08/19/19  EWL is 74%, which places the patient ahead of schedule for expected post surgical weight loss at this time  Initial: 277 2lbs  Current: 188lbs  EWL: 74%  Parker: current  Current BMI is Body mass index is 29 27 kg/m²  · Tolerating a regular diet-yes  · Eating at least 60 grams of protein per day-no; discussed ways to increase and she agrees to f/u with RD  · Following 30/60 minute rule with liquids-no; discussed importance  · Drinking at least 64 ounces of fluid per day-no, advised her to increase  · Drinking carbonated beverages-no  · Sufficient exercise-no d/t foot surgery - she has been cleared and has gym membership and plans to start cardio and lifting again  · Using NSAIDs regularly-no  · Using nicotine-no  · Using alcohol-yes now that dating again   Advised about the risks of alcohol s/p bariatric surgery and recommend avoiding all alcohol

## 2020-01-07 NOTE — ASSESSMENT & PLAN NOTE
-At risk for malabsorption of vitamins/minerals secondary to malabsorption and restriction of intake from bariatric surgery  -Currently taking adequate postop bariatric surgery vitamin supplementation: Fusion 1/day capsule, calcium citrate 500mg BID  -First set of bariatric labs ordered for approximately now  -Patient received education about the importance of adhering to a lifelong supplementation regimen to avoid vitamin/mineral deficiencies

## 2020-01-10 ENCOUNTER — OFFICE VISIT (OUTPATIENT)
Dept: BARIATRICS | Facility: CLINIC | Age: 55
End: 2020-01-10

## 2020-01-10 VITALS
SYSTOLIC BLOOD PRESSURE: 98 MMHG | HEART RATE: 79 BPM | HEIGHT: 67 IN | TEMPERATURE: 98.5 F | RESPIRATION RATE: 17 BRPM | WEIGHT: 188 LBS | DIASTOLIC BLOOD PRESSURE: 56 MMHG | BODY MASS INDEX: 29.51 KG/M2

## 2020-01-10 DIAGNOSIS — K91.2 POSTSURGICAL MALABSORPTION: ICD-10-CM

## 2020-01-10 DIAGNOSIS — Z48.815 ENCOUNTER FOR SURGICAL AFTERCARE FOLLOWING SURGERY OF DIGESTIVE SYSTEM: Primary | ICD-10-CM

## 2020-01-10 DIAGNOSIS — G47.39 OTHER SLEEP APNEA: ICD-10-CM

## 2020-01-10 DIAGNOSIS — K21.9 GASTROESOPHAGEAL REFLUX DISEASE, ESOPHAGITIS PRESENCE NOT SPECIFIED: ICD-10-CM

## 2020-01-10 PROCEDURE — 99214 OFFICE O/P EST MOD 30 MIN: CPT | Performed by: PHYSICIAN ASSISTANT

## 2020-01-10 RX ORDER — IBUPROFEN 200 MG
950 CAPSULE ORAL DAILY
COMMUNITY

## 2020-01-10 NOTE — PATIENT INSTRUCTIONS
GOALS:   · Continued/Maintain healthy weight loss with good nutrition intakes  · Adequate hydration with at least 64oz  fluid intake  · Normal vitamin and mineral levels  · Exercise as tolerated  · Follow up with Kareen Balderas and Bienvenido for additional support  · Follow up with sleep medicine    · Follow-up in 3 months  We kindly ask that your arrive 15 minutes before your scheduled appointment time with your provider to allow our staff to room you, get your vital signs and update your chart  · Follow diet as discussed  · Get lab work done in the next 2 weeks  You have been given a lab slip today  Please call the office if you need a replacement  It is recommended to check with your insurance BEFORE getting labs done to make sure they are covered by your policy  Also, please check with your PCP and other providers before getting labs to avoid duplicate labs  Make sure to HOLD any multivitamins that may contain biotin and any biotin supplements FOR 5 DAYS before any labs since it can affect the results  · Follow vitamin and mineral recommendations as reviewed with you  · Call our office if you have any problems with abdominal pain especially associated with fever, chills, nausea, vomiting or any other concerns  · All  Post-bariatric surgery patients should be aware that very small quantities of any alcohol can cause impairment and it is very possible not to feel the effect  The effect can be in the system for several hours  It is also a stomach irritant  · It is advised to AVOID alcohol, Nonsteroidal antiinflammatory drugs (NSAIDS) and nicotine of all forms   Any of these can cause stomach irritation/pain

## 2020-01-28 ENCOUNTER — TELEPHONE (OUTPATIENT)
Dept: BARIATRICS | Facility: CLINIC | Age: 55
End: 2020-01-28

## 2020-01-28 DIAGNOSIS — Z48.815 ENCOUNTER FOR SURGICAL AFTERCARE FOLLOWING SURGERY OF DIGESTIVE SYSTEM: ICD-10-CM

## 2020-01-28 DIAGNOSIS — K91.2 POSTSURGICAL MALABSORPTION: Primary | ICD-10-CM

## 2020-01-28 NOTE — TELEPHONE ENCOUNTER
Left message  Noted labs from 01/14/20:     -For some reason no zinc levels drawn despite ordering  Given rest of labs look great will make sure it gets drawn on the next set of labs  -Vitamin D is higher end of normal - avoid taking more than 3,000IU/day     -Repeat all labs in 3 months

## 2020-03-02 NOTE — ASSESSMENT & PLAN NOTE
-At risk for malabsorption of vitamins/minerals secondary to malabsorption and restriction of intake from bariatric surgery  -Currently taking adequate postop bariatric surgery vitamin supplementation: Fusion BID, calcium citrate 500mg BID - has not been as consistent with remembering all vitamins - advised her to set timers and increase compliance  -Last set of bariatric labs completed on 01/14/20 and showed Vitamins WNL (vitamin D high normal, no zinc drawn)  -Next set of bariatric labs ordered for approximately 2 weeks  -Patient received education about the importance of adhering to a lifelong supplementation regimen to avoid vitamin/mineral deficiencies

## 2020-03-02 NOTE — ASSESSMENT & PLAN NOTE
-No longer wearing CPAP d/t poor fit  -Encouraged consistent use of CPAP and follow up with sleep medicine for mask refitting/adjustments   -She is seeing her PCP this week and she will reorder sleep study  -Discussed risks of untreated sleep apnea such as sudden cardiac death by arrhythmia, uncontrolled hypertension, difficulty with weight loss, decreased quality sleep, increased insulin resistance, and stroke

## 2020-03-02 NOTE — ASSESSMENT & PLAN NOTE
-s/p Vertical Sleeve Gastrectomy and paraesophageal hernia repair with Dr Taz Saleh on 08/19/19  EWL is 80%, which places the patient ahead of schedule for expected post surgical weight loss at this time  Initial: 277 2lbs  Current: 181 8lbs  EWL: 80%  Parker: current  Current BMI is Body mass index is 28 3 kg/m²  · Tolerating a regular diet-yes  · Eating at least 60 grams of protein per day-yes  · Following 30/60 minute rule with liquids-no; discussed importance and she agrees to stop drinking with her meals  · Drinking at least 64 ounces of fluid per day-no, advised her to increase - discussed strategies for increasing water intake  · Drinking carbonated beverages-no  · Sufficient exercise-no d/t foot surgery - she has been cleared and has gym membership and plans to start cardio and lifting again soon  Doing PT currently   · Using NSAIDs regularly-no  · Using nicotine-no  · Using alcohol-yes now that dating again   Advised about the risks of alcohol s/p bariatric surgery and recommend avoiding all alcohol

## 2020-03-02 NOTE — ASSESSMENT & PLAN NOTE
-No longer on PPI, but has occasional reflux a few times a week that seems to be food related  -Will give Rx for Pepcid   -Continue to avoid food triggers, overeating, laying down after eating, drinking with meals, iced cold fluids  -Notify me if worsening of symptoms

## 2020-03-02 NOTE — PROGRESS NOTES
Assessment/Plan:    Encounter for surgical aftercare following surgery of digestive system  -s/p Vertical Sleeve Gastrectomy and paraesophageal hernia repair with Dr Jessica Dias on 08/19/19  EWL is 80%, which places the patient ahead of schedule for expected post surgical weight loss at this time  Initial: 277 2lbs  Current: 181 8lbs  EWL: 80%  Parker: current  Current BMI is Body mass index is 28 3 kg/m²  · Tolerating a regular diet-yes  · Eating at least 60 grams of protein per day-yes  · Following 30/60 minute rule with liquids-no; discussed importance and she agrees to stop drinking with her meals  · Drinking at least 64 ounces of fluid per day-no, advised her to increase - discussed strategies for increasing water intake  · Drinking carbonated beverages-no  · Sufficient exercise-no d/t foot surgery - she has been cleared and has gym membership and plans to start cardio and lifting again soon  Doing PT currently   · Using NSAIDs regularly-no  · Using nicotine-no  · Using alcohol-yes now that dating again   Advised about the risks of alcohol s/p bariatric surgery and recommend avoiding all alcohol      Postsurgical malabsorption  -At risk for malabsorption of vitamins/minerals secondary to malabsorption and restriction of intake from bariatric surgery  -Currently taking adequate postop bariatric surgery vitamin supplementation: Fusion BID, calcium citrate 500mg BID - has not been as consistent with remembering all vitamins - advised her to set timers and increase compliance  -Last set of bariatric labs completed on 01/14/20 and showed Vitamins WNL (vitamin D high normal, no zinc drawn)  -Next set of bariatric labs ordered for approximately 2 weeks  -Patient received education about the importance of adhering to a lifelong supplementation regimen to avoid vitamin/mineral deficiencies       Sleep apnea  -No longer wearing CPAP d/t poor fit  -Encouraged consistent use of CPAP and follow up with sleep medicine for mask refitting/adjustments   -She is seeing her PCP this week and she will reorder sleep study  -Discussed risks of untreated sleep apnea such as sudden cardiac death by arrhythmia, uncontrolled hypertension, difficulty with weight loss, decreased quality sleep, increased insulin resistance, and stroke  GERD (gastroesophageal reflux disease)  -No longer on PPI, but has occasional reflux a few times a week that seems to be food related  -Will give Rx for Pepcid   -Continue to avoid food triggers, overeating, laying down after eating, drinking with meals, iced cold fluids  -Notify me if worsening of symptoms    Anxiety  -On Lexapro  -Encourage continued f/u with counselor/therapist and SW here       Diagnoses and all orders for this visit:    Encounter for surgical aftercare following surgery of digestive system    Postsurgical malabsorption    Other sleep apnea    Gastroesophageal reflux disease, esophagitis presence not specified  -     famotidine (PEPCID) 20 mg tablet; Take 1 tablet (20 mg total) by mouth 2 (two) times a day    Anxiety    Other orders  -     bimatoprost (LATISSE) 0 03 % ophthalmic solution; PLACE ONE DROP ON APPLICATOR AND APPLY EVENLY ALONG THE SKIN OF THE UPPER EYELID AT THE BASE OF EYELASHES ONCE DAILY AT BEDTIME  REPEAT PROC          Subjective:      Patient ID: Issac Rg is a 47 y o  female  -s/p Vertical Sleeve Gastrectomy and paraesophageal hernia repair with Dr Tierra Patel on 08/19/19  Presents to the office today for routine follow up  Tolerating regular diet; denies N/V, dysphagia  Occasional reflux, usually relate to food triggers  She admits to some mindless snacking habits returning  Not using CPAP  Seeing her PCP on Thursday  Not taking her vitamins as consistently as before       The following portions of the patient's history were reviewed and updated as appropriate: allergies, current medications, past family history, past medical history, past social history, past surgical history and problem list     Review of Systems   Constitutional: Negative for chills and fever  Unexpected weight change: planned weight loss  HENT: Negative for trouble swallowing  Respiratory: Negative for cough and shortness of breath  Cardiovascular: Negative for chest pain and palpitations  Gastrointestinal: Negative for abdominal pain, constipation, diarrhea, nausea and vomiting  Musculoskeletal: Positive for arthralgias (R foot) and gait problem (limping on R foot)  Neurological: Negative for dizziness  Psychiatric/Behavioral:        Denies anxiety and depression         Objective:      BP 98/64 (BP Location: Right arm, Patient Position: Sitting, Cuff Size: Adult)   Pulse 76   Temp 98 6 °F (37 °C) (Tympanic)   Resp 16   Ht 5' 7 2" (1 707 m)   Wt 82 5 kg (181 lb 12 8 oz)   LMP 08/22/2018   BMI 28 30 kg/m²     Colonoscopy-Completed at age 48 - advised her to call GI and inquire about f/u        Physical Exam   Constitutional: She is oriented to person, place, and time  She appears well-developed and well-nourished  No distress  HENT:   Head: Normocephalic and atraumatic  Eyes: Pupils are equal, round, and reactive to light  No scleral icterus  Cardiovascular: Normal rate, regular rhythm and normal heart sounds  Pulmonary/Chest: Effort normal and breath sounds normal  No respiratory distress  Abdominal: Soft  Bowel sounds are normal  She exhibits no distension  There is no tenderness  No incisional hernias appreciated   Musculoskeletal: She exhibits edema (R ankle/foot)  Limping    Neurological: She is alert and oriented to person, place, and time  Skin: Skin is warm and dry  Psychiatric: She has a normal mood and affect  Nursing note and vitals reviewed  BARRIERS: none identified    GOALS:   · Continued/Maintain healthy weight loss with good nutrition intakes  · Adequate hydration with at least 64oz  fluid intake    · Normal vitamin and mineral levels  · Exercise as tolerated  · Taper off the omeprazole and use pepcid as needed  · Avoid mindless snacking, start exercising as tolerated, do not drink with your meals! · Follow-up in 6 months  We kindly ask that your arrive 15 minutes before your scheduled appointment time with your provider to allow our staff to room you, get your vital signs and update your chart  · Follow diet as discussed  · Get lab work done in the next 2 weeks  You have been given a lab slip today  Please call the office if you need a replacement  It is recommended to check with your insurance BEFORE getting labs done to make sure they are covered by your policy  Also, please check with your PCP and other providers before getting labs to avoid duplicate labs  Make sure to HOLD any multivitamins that may contain biotin and any biotin supplements FOR 5 DAYS before any labs since it can affect the results  · Follow vitamin and mineral recommendations as reviewed with you  · Call our office if you have any problems with abdominal pain especially associated with fever, chills, nausea, vomiting or any other concerns  · All  Post-bariatric surgery patients should be aware that very small quantities of any alcohol can cause impairment and it is very possible not to feel the effect  The effect can be in the system for several hours  It is also a stomach irritant  · It is advised to AVOID alcohol, Nonsteroidal antiinflammatory drugs (NSAIDS) and nicotine of all forms   Any of these can cause stomach irritation/pain

## 2020-03-03 ENCOUNTER — OFFICE VISIT (OUTPATIENT)
Dept: BARIATRICS | Facility: CLINIC | Age: 55
End: 2020-03-03
Payer: COMMERCIAL

## 2020-03-03 VITALS
RESPIRATION RATE: 16 BRPM | BODY MASS INDEX: 28.53 KG/M2 | SYSTOLIC BLOOD PRESSURE: 98 MMHG | HEART RATE: 76 BPM | DIASTOLIC BLOOD PRESSURE: 64 MMHG | TEMPERATURE: 98.6 F | WEIGHT: 181.8 LBS | HEIGHT: 67 IN

## 2020-03-03 DIAGNOSIS — K21.9 GASTROESOPHAGEAL REFLUX DISEASE, ESOPHAGITIS PRESENCE NOT SPECIFIED: ICD-10-CM

## 2020-03-03 DIAGNOSIS — K91.2 POSTSURGICAL MALABSORPTION: ICD-10-CM

## 2020-03-03 DIAGNOSIS — F41.9 ANXIETY: ICD-10-CM

## 2020-03-03 DIAGNOSIS — G47.39 OTHER SLEEP APNEA: ICD-10-CM

## 2020-03-03 DIAGNOSIS — Z48.815 ENCOUNTER FOR SURGICAL AFTERCARE FOLLOWING SURGERY OF DIGESTIVE SYSTEM: Primary | ICD-10-CM

## 2020-03-03 PROCEDURE — 99214 OFFICE O/P EST MOD 30 MIN: CPT | Performed by: PHYSICIAN ASSISTANT

## 2020-03-03 RX ORDER — FAMOTIDINE 20 MG/1
20 TABLET, FILM COATED ORAL 2 TIMES DAILY
Qty: 60 TABLET | Refills: 2 | Status: SHIPPED | OUTPATIENT
Start: 2020-03-03

## 2020-03-03 RX ORDER — BIMATOPROST 3 UG/ML
SOLUTION TOPICAL
COMMUNITY
Start: 2020-02-10

## 2020-03-03 NOTE — PATIENT INSTRUCTIONS
GOALS:   · Continued/Maintain healthy weight loss with good nutrition intakes  · Adequate hydration with at least 64oz  fluid intake  · Normal vitamin and mineral levels  · Exercise as tolerated  · Taper off the omeprazole and use pepcid as needed  · Avoid mindless snacking, start exercising as tolerated, do not drink with your meals! · Follow-up in 6 months  We kindly ask that your arrive 15 minutes before your scheduled appointment time with your provider to allow our staff to room you, get your vital signs and update your chart  · Follow diet as discussed  · Get lab work done in the next 2 weeks  You have been given a lab slip today  Please call the office if you need a replacement  It is recommended to check with your insurance BEFORE getting labs done to make sure they are covered by your policy  Also, please check with your PCP and other providers before getting labs to avoid duplicate labs  Make sure to HOLD any multivitamins that may contain biotin and any biotin supplements FOR 5 DAYS before any labs since it can affect the results  · Follow vitamin and mineral recommendations as reviewed with you  · Call our office if you have any problems with abdominal pain especially associated with fever, chills, nausea, vomiting or any other concerns  · All  Post-bariatric surgery patients should be aware that very small quantities of any alcohol can cause impairment and it is very possible not to feel the effect  The effect can be in the system for several hours  It is also a stomach irritant  · It is advised to AVOID alcohol, Nonsteroidal antiinflammatory drugs (NSAIDS) and nicotine of all forms   Any of these can cause stomach irritation/pain

## 2020-05-28 LAB
25(OH)D3+25(OH)D2 SERPL-MCNC: 51.8 NG/ML (ref 30–100)
ALBUMIN SERPL-MCNC: 4.1 G/DL (ref 3.8–4.9)
ALBUMIN/GLOB SERPL: 1.5 {RATIO} (ref 1.2–2.2)
ALP SERPL-CCNC: 58 IU/L (ref 39–117)
ALT SERPL-CCNC: 13 IU/L (ref 0–32)
AST SERPL-CCNC: 17 IU/L (ref 0–40)
BASOPHILS # BLD AUTO: 0 X10E3/UL (ref 0–0.2)
BASOPHILS NFR BLD AUTO: 1 %
BILIRUB SERPL-MCNC: 0.3 MG/DL (ref 0–1.2)
BUN SERPL-MCNC: 20 MG/DL (ref 6–24)
BUN/CREAT SERPL: 25 (ref 9–23)
CALCIUM SERPL-MCNC: 9.2 MG/DL (ref 8.7–10.2)
CHLORIDE SERPL-SCNC: 104 MMOL/L (ref 96–106)
CO2 SERPL-SCNC: 18 MMOL/L (ref 20–29)
COPPER SERPL-MCNC: 123 UG/DL (ref 72–166)
CREAT SERPL-MCNC: 0.8 MG/DL (ref 0.57–1)
EOSINOPHIL # BLD AUTO: 0.1 X10E3/UL (ref 0–0.4)
EOSINOPHIL NFR BLD AUTO: 2 %
ERYTHROCYTE [DISTWIDTH] IN BLOOD BY AUTOMATED COUNT: 11.9 % (ref 11.7–15.4)
FERRITIN SERPL-MCNC: 132 NG/ML (ref 15–150)
FOLATE SERPL-MCNC: 15.5 NG/ML
GLOBULIN SER-MCNC: 2.7 G/DL (ref 1.5–4.5)
GLUCOSE SERPL-MCNC: 86 MG/DL (ref 65–99)
HCT VFR BLD AUTO: 42.3 % (ref 34–46.6)
HGB BLD-MCNC: 14.4 G/DL (ref 11.1–15.9)
IMM GRANULOCYTES # BLD: 0 X10E3/UL (ref 0–0.1)
IMM GRANULOCYTES NFR BLD: 0 %
IRON SATN MFR SERPL: 30 % (ref 15–55)
IRON SERPL-MCNC: 85 UG/DL (ref 27–159)
LYMPHOCYTES # BLD AUTO: 1.8 X10E3/UL (ref 0.7–3.1)
LYMPHOCYTES NFR BLD AUTO: 27 %
MCH RBC QN AUTO: 33.4 PG (ref 26.6–33)
MCHC RBC AUTO-ENTMCNC: 34 G/DL (ref 31.5–35.7)
MCV RBC AUTO: 98 FL (ref 79–97)
MONOCYTES # BLD AUTO: 0.4 X10E3/UL (ref 0.1–0.9)
MONOCYTES NFR BLD AUTO: 6 %
NEUTROPHILS # BLD AUTO: 4.3 X10E3/UL (ref 1.4–7)
NEUTROPHILS NFR BLD AUTO: 64 %
PLATELET # BLD AUTO: 250 X10E3/UL (ref 150–450)
POTASSIUM SERPL-SCNC: 5 MMOL/L (ref 3.5–5.2)
PROT SERPL-MCNC: 6.8 G/DL (ref 6–8.5)
PTH-INTACT SERPL-MCNC: 16 PG/ML (ref 15–65)
RBC # BLD AUTO: 4.31 X10E6/UL (ref 3.77–5.28)
SL AMB EGFR AFRICAN AMERICAN: 97 ML/MIN/1.73
SL AMB EGFR NON AFRICAN AMERICAN: 84 ML/MIN/1.73
SODIUM SERPL-SCNC: 142 MMOL/L (ref 134–144)
TIBC SERPL-MCNC: 285 UG/DL (ref 250–450)
UIBC SERPL-MCNC: 200 UG/DL (ref 131–425)
VIT A SERPL-MCNC: 49.4 UG/DL (ref 20.1–62)
VIT B1 BLD-SCNC: 155 NMOL/L (ref 66.5–200)
VIT B12 SERPL-MCNC: 675 PG/ML (ref 232–1245)
WBC # BLD AUTO: 6.7 X10E3/UL (ref 3.4–10.8)
ZINC SERPL-MCNC: 83 UG/DL (ref 56–134)

## 2020-05-29 ENCOUNTER — TELEPHONE (OUTPATIENT)
Dept: BARIATRICS | Facility: CLINIC | Age: 55
End: 2020-05-29

## 2020-05-29 DIAGNOSIS — K91.2 POSTSURGICAL MALABSORPTION: Primary | ICD-10-CM

## 2020-05-29 DIAGNOSIS — Z48.815 ENCOUNTER FOR SURGICAL AFTERCARE FOLLOWING SURGERY OF DIGESTIVE SYSTEM: ICD-10-CM

## 2021-02-22 ENCOUNTER — TELEPHONE (OUTPATIENT)
Dept: BARIATRICS | Facility: CLINIC | Age: 56
End: 2021-02-22

## 2021-02-22 NOTE — TELEPHONE ENCOUNTER
Left message to follow up on missed appointment in August 2020 with RD and PA for one year post-op and offered to reschedule this appointment  Return phone number for SW and  were left

## 2022-08-26 ENCOUNTER — TELEPHONE (OUTPATIENT)
Dept: BARIATRICS | Facility: CLINIC | Age: 57
End: 2022-08-26

## (undated) DEVICE — COVIDIEN GIA BLACK (XTRA THICK) RELOAD

## (undated) DEVICE — [HIGH FLOW INSUFFLATOR,  DO NOT USE IF PACKAGE IS DAMAGED,  KEEP DRY,  KEEP AWAY FROM SUNLIGHT,  PROTECT FROM HEAT AND RADIOACTIVE SOURCES.]: Brand: PNEUMOSURE

## (undated) DEVICE — PMI DISPOSABLE PUNCTURE CLOSURE DEVICE / SUTURE GRASPER: Brand: PMI

## (undated) DEVICE — SUT MONOCRYL 4-0 PS-2 27 IN Y426H

## (undated) DEVICE — GLOVE SRG BIOGEL 7

## (undated) DEVICE — TIBURON LAPAROSCOPIC ABDOMINAL DRAPE: Brand: CONVERTORS

## (undated) DEVICE — 3000CC GUARDIAN II: Brand: GUARDIAN

## (undated) DEVICE — VIOLET BRAIDED (POLYGLACTIN 910), SYNTHETIC ABSORBABLE SUTURE: Brand: COATED VICRYL

## (undated) DEVICE — POWER SHELL SIGNIA

## (undated) DEVICE — WEBRIL 6 IN UNSTERILE

## (undated) DEVICE — ADHESIVE SKIN CLOSR DERMABOND PRINEO

## (undated) DEVICE — GLOVE INDICATOR PI UNDERGLOVE SZ 7 BLUE

## (undated) DEVICE — TROCAR: Brand: KII FIOS FIRST ENTRY

## (undated) DEVICE — HARMONIC ACE +7 LAPAROSCOPIC SHEARS ADVANCED HEMOSTASIS 5MM DIAMETER 36CM SHAFT LENGTH  FOR USE WITH GRAY HAND PIECE ONLY: Brand: HARMONIC ACE

## (undated) DEVICE — PACK GENERAL LF

## (undated) DEVICE — NEEDLE SPINAL 20G X 3.5 LF

## (undated) DEVICE — 5 MM CURVED DISSECTORS WITH MONOPOLAR CAUTERY: Brand: ENDOPATH

## (undated) DEVICE — SPONGE 4 X 4 XRAY 16 PLY STRL LF RFD

## (undated) DEVICE — VISUALIZATION SYSTEM: Brand: CLEARIFY

## (undated) DEVICE — SURGICAL GOWN, XL SMARTSLEEVE: Brand: CONVERTORS

## (undated) DEVICE — INSUFFLATION NEEDLE TO ESTABLISH PNEUMOPERITONEUM.: Brand: INSUFFLATION NEEDLE

## (undated) DEVICE — ENDOPATH XCEL UNIVERSAL TROCAR STABLILITY SLEEVES: Brand: ENDOPATH XCEL

## (undated) DEVICE — TUBING SUCTION 5MM X 12 FT

## (undated) DEVICE — ENDOPATH 5MM CURVED SCISSORS WITH MONOPOLAR CAUTERY: Brand: ENDOPATH

## (undated) DEVICE — ADHESIVE SKIN CLSR DERMABOND NX

## (undated) DEVICE — CHLORAPREP HI-LITE 26ML ORANGE

## (undated) DEVICE — TRAVELKIT CONTAINS FIRST STEP KIT (200ML EP-4 KIT) AND SOILED SCOPE BAG - 1 KIT: Brand: TRAVELKIT CONTAINS FIRST STEP KIT AND SOILED SCOPE BAG

## (undated) DEVICE — SCD SEQUENTIAL COMPRESSION COMFORT SLEEVE MEDIUM KNEE LENGTH: Brand: KENDALL SCD

## (undated) DEVICE — TUBING SMOKE EVAC W/FILTRATION DEVICE PLUMEPORT ACTIV

## (undated) DEVICE — FORCEP BX RADIAL JAW 100CM

## (undated) DEVICE — COVIDIEN ENDO GIA PURPLE (MED) RELOAD 60MM

## (undated) DEVICE — ALLENTOWN LAP CHOLE APP PACK: Brand: CARDINAL HEALTH

## (undated) DEVICE — 3M™ TEGADERM™ TRANSPARENT FILM DRESSING FRAME STYLE, 1626W, 4 IN X 4-3/4 IN (10 CM X 12 CM), 50/CT 4CT/CASE: Brand: 3M™ TEGADERM™

## (undated) DEVICE — SYRINGE 30ML LL

## (undated) DEVICE — PAD GROUNDING ADULT

## (undated) DEVICE — INTENDED FOR TISSUE SEPARATION, AND OTHER PROCEDURES THAT REQUIRE A SHARP SURGICAL BLADE TO PUNCTURE OR CUT.: Brand: BARD-PARKER SAFETY BLADES SIZE 11, STERILE

## (undated) DEVICE — PAD CAST 4 IN COTTON NON STERILE

## (undated) DEVICE — URETERAL CATHETER ADAPTOR TIP

## (undated) DEVICE — ENDOPATH XCEL BLADELESS TROCARS WITH STABILITY SLEEVES: Brand: ENDOPATH XCEL

## (undated) DEVICE — SCD SEQUENTIAL COMPRESSION COMFORT SLEEVE LARGE KNEE LENGTH: Brand: KENDALL SCD

## (undated) DEVICE — VISIGI 3D®  CALIBRATION SYSTEM  SIZE 36FR SLEEVE/STD: Brand: BOEHRINGER® VISIGI 3D™ SLEEVE GASTRECTOMY CALIBRATION SYSTEM, SIZE 36FR

## (undated) DEVICE — LIGHT HANDLE COVER SLEEVE DISP BLUE STELLAR

## (undated) DEVICE — TELFA NON-ADHERENT ABSORBENT DRESSING: Brand: TELFA

## (undated) DEVICE — DRAPE EQUIPMENT RF WAND

## (undated) DEVICE — GLOVE SRG BIOGEL ECLIPSE 7.5

## (undated) DEVICE — INTENDED FOR TISSUE SEPARATION, AND OTHER PROCEDURES THAT REQUIRE A SHARP SURGICAL BLADE TO PUNCTURE OR CUT.: Brand: BARD-PARKER ® CARBON RIB-BACK BLADES

## (undated) DEVICE — IRRIG ENDO FLO TUBING

## (undated) DEVICE — NEEDLE HYPO 22G X 1-1/2 IN

## (undated) DEVICE — KERLIX BANDAGE ROLL: Brand: KERLIX